# Patient Record
Sex: FEMALE | Race: WHITE | Employment: FULL TIME | URBAN - METROPOLITAN AREA
[De-identification: names, ages, dates, MRNs, and addresses within clinical notes are randomized per-mention and may not be internally consistent; named-entity substitution may affect disease eponyms.]

---

## 2020-12-03 ENCOUNTER — HOSPITAL ENCOUNTER (EMERGENCY)
Age: 35
Discharge: HOME | End: 2020-12-03
Attending: EMERGENCY MEDICINE
Payer: COMMERCIAL

## 2020-12-03 ENCOUNTER — APPOINTMENT (OUTPATIENT)
Dept: GENERAL RADIOLOGY | Age: 35
End: 2020-12-03
Payer: COMMERCIAL

## 2020-12-03 VITALS
BODY MASS INDEX: 30.6 KG/M2 | TEMPERATURE: 97.3 F | WEIGHT: 183.64 LBS | SYSTOLIC BLOOD PRESSURE: 106 MMHG | DIASTOLIC BLOOD PRESSURE: 60 MMHG | HEART RATE: 57 BPM | OXYGEN SATURATION: 96 % | HEIGHT: 65 IN | RESPIRATION RATE: 16 BRPM

## 2020-12-03 DIAGNOSIS — S82.892A CLOSED FRACTURE DISLOCATION OF LEFT ANKLE, INITIAL ENCOUNTER: Primary | ICD-10-CM

## 2020-12-03 PROCEDURE — 2500000003 HC RX 250 WO HCPCS: Performed by: EMERGENCY MEDICINE

## 2020-12-03 PROCEDURE — 99285 EMERGENCY DEPT VISIT HI MDM: CPT

## 2020-12-03 PROCEDURE — 27788 TREATMENT OF ANKLE FRACTURE: CPT

## 2020-12-03 PROCEDURE — 27824 TREAT LOWER LEG FRACTURE: CPT

## 2020-12-03 PROCEDURE — 73610 X-RAY EXAM OF ANKLE: CPT

## 2020-12-03 PROCEDURE — 6370000000 HC RX 637 (ALT 250 FOR IP): Performed by: EMERGENCY MEDICINE

## 2020-12-03 PROCEDURE — 96372 THER/PROPH/DIAG INJ SC/IM: CPT

## 2020-12-03 RX ORDER — OXYCODONE HYDROCHLORIDE AND ACETAMINOPHEN 5; 325 MG/1; MG/1
2 TABLET ORAL ONCE
Status: COMPLETED | OUTPATIENT
Start: 2020-12-03 | End: 2020-12-03

## 2020-12-03 RX ORDER — NAPROXEN 250 MG/1
500 TABLET ORAL ONCE
Status: COMPLETED | OUTPATIENT
Start: 2020-12-03 | End: 2020-12-03

## 2020-12-03 RX ORDER — ONDANSETRON 4 MG/1
4 TABLET, ORALLY DISINTEGRATING ORAL ONCE
Status: COMPLETED | OUTPATIENT
Start: 2020-12-03 | End: 2020-12-03

## 2020-12-03 RX ORDER — CITALOPRAM 20 MG/1
40 TABLET ORAL DAILY
COMMUNITY

## 2020-12-03 RX ORDER — KETAMINE HYDROCHLORIDE 50 MG/ML
4 INJECTION, SOLUTION, CONCENTRATE INTRAMUSCULAR; INTRAVENOUS ONCE
Status: COMPLETED | OUTPATIENT
Start: 2020-12-03 | End: 2020-12-03

## 2020-12-03 RX ORDER — ONDANSETRON 4 MG/1
4 TABLET, ORALLY DISINTEGRATING ORAL EVERY 8 HOURS PRN
Qty: 20 TABLET | Refills: 0 | Status: SHIPPED | OUTPATIENT
Start: 2020-12-03

## 2020-12-03 RX ORDER — OXYCODONE HYDROCHLORIDE AND ACETAMINOPHEN 5; 325 MG/1; MG/1
1 TABLET ORAL EVERY 6 HOURS PRN
Qty: 16 TABLET | Refills: 0 | Status: ON HOLD | OUTPATIENT
Start: 2020-12-03 | End: 2020-12-07 | Stop reason: HOSPADM

## 2020-12-03 RX ORDER — NAPROXEN 500 MG/1
500 TABLET ORAL 2 TIMES DAILY PRN
Qty: 60 TABLET | Refills: 0 | Status: ON HOLD | OUTPATIENT
Start: 2020-12-03 | End: 2020-12-07 | Stop reason: HOSPADM

## 2020-12-03 RX ADMIN — OXYCODONE HYDROCHLORIDE AND ACETAMINOPHEN 2 TABLET: 5; 325 TABLET ORAL at 09:34

## 2020-12-03 RX ADMIN — KETAMINE HYDROCHLORIDE 335 MG: 50 INJECTION INTRAMUSCULAR; INTRAVENOUS at 11:36

## 2020-12-03 RX ADMIN — ONDANSETRON 4 MG: 4 TABLET, ORALLY DISINTEGRATING ORAL at 10:03

## 2020-12-03 RX ADMIN — NAPROXEN 500 MG: 250 TABLET ORAL at 09:34

## 2020-12-03 RX ADMIN — ONDANSETRON 4 MG: 4 TABLET, ORALLY DISINTEGRATING ORAL at 13:41

## 2020-12-03 ASSESSMENT — PAIN DESCRIPTION - DESCRIPTORS: DESCRIPTORS: ACHING;THROBBING

## 2020-12-03 ASSESSMENT — PAIN DESCRIPTION - PAIN TYPE: TYPE: ACUTE PAIN

## 2020-12-03 NOTE — ED NOTES
Checked on patient. Patient resting comfortably at this time.       Neel Del Cid RN  12/03/20 4879
Closed reduction completed by Dr. Mario Grullon. RN did a short leg OCL and sugar tong per Dr. Mario Grullon order. Patient tolerated well.        Maria Del Carmen Barber RN  12/03/20 0917
Discharge instructions reviewed with patient and . Patient and  verbalized understanding. Prescription x3 given. Patient given crutches unable to practice. Patient stated has used crutches before. Patient stood to make sure measurement was correct. Patient with movement has nausea. Patient placed in wheelchair to be taken out to car with .        Maria Del Carmen Barber RN  12/03/20 5229
Dr. Madeleine Sims into speak with patient and . Patient stated ready for discharge.       Elisha Brewer RN  12/03/20 8332
Informed consent completed. Dr. Marylene Levin in room discussing procedure and medication that will be given.        Oral HIGINIO Hugo  12/03/20 9797
Ketamine given per Dr. Antoinette Linton order.        Neel Del Cid RN  12/03/20 9681
Patient answers to her name but unable to tell us where she is at. Nystagmus in eyes.       Gabrielle Mendoza RN  12/03/20 3394
Patient has complaints of nausea. Will ask Dr. Rebecca Lieberman for nausea medication.        Lilli Rebollar RN  12/03/20 1600
Time out done per Dr. Juan Lawrence and nurses.        Dennie Leeds, RN  12/03/20 4351
Zofran given per order.        Timothy Castro RN  12/03/20 8576
Hypertension, unspecified type

## 2020-12-03 NOTE — ED PROVIDER NOTES
[facility]  Chief Complaint   Patient presents with    Ankle Pain     Patient slid on ice and injured left ankle. Some deformity. HISTORY OF PRESENT ILLNESS  Vu Diaz is a 37 y.o. female who presents to the ED complaining of left ankle deformity sustained just prior to arrival and unable to bear weight since her injury which was related to a slip and fall on ice. She denies any other injuries. Her left ankle does appear deformed and she has tenderness diffusely at the ankle joint. Denies any pain at the knee or hip on the left lower extremity. She does have some tingling distal to the foot. No laceration. No injury to the right lower extremity, upper extremities, head or neck. She says that the backpack took the brunt of the rest of the fall. No other complaints, modifying factors or associated symptoms. Nursing notes reviewed. Past Medical History:   Diagnosis Date    Depression      History reviewed. No pertinent surgical history. History reviewed. No pertinent family history.   Social History     Socioeconomic History    Marital status:      Spouse name: Not on file    Number of children: Not on file    Years of education: Not on file    Highest education level: Not on file   Occupational History    Not on file   Social Needs    Financial resource strain: Not on file    Food insecurity     Worry: Not on file     Inability: Not on file    Transportation needs     Medical: Not on file     Non-medical: Not on file   Tobacco Use    Smoking status: Current Every Day Smoker     Packs/day: 0.50     Types: Cigarettes    Smokeless tobacco: Never Used   Substance and Sexual Activity    Alcohol use: Never     Frequency: Never    Drug use: Never    Sexual activity: Yes     Partners: Male   Lifestyle    Physical activity     Days per week: Not on file     Minutes per session: Not on file    Stress: Not on file   Relationships  Social connections     Talks on phone: Not on file     Gets together: Not on file     Attends Denominational service: Not on file     Active member of club or organization: Not on file     Attends meetings of clubs or organizations: Not on file     Relationship status: Not on file    Intimate partner violence     Fear of current or ex partner: Not on file     Emotionally abused: Not on file     Physically abused: Not on file     Forced sexual activity: Not on file   Other Topics Concern    Not on file   Social History Narrative    Not on file     Current Facility-Administered Medications   Medication Dose Route Frequency Provider Last Rate Last Dose    ketamine (KETALAR) injection 335 mg  4 mg/kg Intramuscular Once Antonia Shay MD         Current Outpatient Medications   Medication Sig Dispense Refill    citalopram (CELEXA) 20 MG tablet Take 40 mg by mouth daily      oxyCODONE-acetaminophen (PERCOCET) 5-325 MG per tablet Take 1 tablet by mouth every 6 hours as needed for Pain (CAUTION: This medication can cause dizziness.  Do not drive or operate heavy machinery when on this medication.) for up to 7 days. 16 tablet 0    naproxen (NAPROSYN) 500 MG tablet Take 1 tablet by mouth 2 times daily as needed for Pain 60 tablet 0     No Known Allergies    REVIEW OF SYSTEMS  6 systems reviewed, pertinent positives per HPI otherwise noted to be negative    PHYSICAL EXAM   /84   Pulse 80   Temp 97.3 °F (36.3 °C) (Oral)   Resp 20   Ht 5' 5\" (1.651 m)   Wt 183 lb 10.3 oz (83.3 kg)   SpO2 100%   BMI 30.56 kg/m²    GENERAL APPEARANCE: Awake and alert. Cooperative. No acute distress. HEAD: Normocephalic. Atraumatic. EYES: PERRL. EOM's grossly intact. ENT: Mucous membranes are moist.   NECK: Supple. Normal ROM. CHEST: Equal symmetric chest rise. RRR  LUNGS: Breathing is unlabored. Speaking comfortably in full sentences. CTAB  ABDOMEN: Nondistended, nontender  MUSCULOSKELETAL:  RLE: No tenderness.   2+ DP and PT. Sensation and motor function fully intact. Full range of motion of all major joints. No erythema, bruising, or lacerations. Compartments are soft. 2+ patellar reflex. Achilles nontender and intact. Able to bear weight. No joint swelling or effusions are noted. LLE: Close deformity noted to the left ankle with gross dislocation appreciated without laceration. There is tenderness diffusely at the ankle joint. Distally the foot has a 2+ DP and PT pulse, no mottling, some tingling but no dense numbness appreciated. There is no tenderness at the proximal fibula, knee, or proximal left lower extremity. Sensation and motor function fully intact. Full range of motion of OTHER major joints except ankle. Bruising to left ankle. No OTHER erythema, bruising, or lacerations. Compartments are soft. 2+ patellar reflex. Achilles nontender and intact. UNABLE to bear weight. No joint swelling or effusions are noted. SKIN: Warm and dry. No acute rashes. NEUROLOGICAL: Alert and oriented. Strength is 5/5 in all extremities and sensation is intact. RADIOLOGY    Xr Ankle Left (min 3 Views)    Result Date: 12/3/2020  EXAMINATION: THREE XRAY VIEWS OF THE LEFT ANKLE 12/3/2020 8:54 am COMPARISON: Examination from 1 hour prior HISTORY: ORDERING SYSTEM PROVIDED HISTORY: reduction/splint TECHNOLOGIST PROVIDED HISTORY: Reason for exam:->reduction/splint Reason for Exam: post reduction in splint Acuity: Acute Type of Exam: Subsequent/Follow-up FINDINGS: Improved alignment of the ankle mortise and distal fibular fracture status post reduction. Overlying splinting material obscures fine osseous detail. Improved alignment status post reduction. Xr Ankle Left (min 3 Views)    Result Date: 12/3/2020  EXAMINATION: THREE XRAY VIEWS OF THE LEFT ANKLE 12/3/2020 9:37 am COMPARISON: None.  HISTORY: ORDERING SYSTEM PROVIDED HISTORY: trauma TECHNOLOGIST PROVIDED HISTORY: Reason for exam:->trauma Reason for Exam: pt has pain in her entire ankle with swelling s/p falling on ice today Acuity: Acute Type of Exam: Initial Mechanism of Injury: fall on ice Relevant Medical/Surgical History: pt unable to flex foot FINDINGS: There is displaced oblique fracture of the distal fibular shaft at the base of the lateral malleolus. No definitive fracture elsewhere. There is anterior dislocation of the fibula and tibia in relation to the talus and lateral malleolus. Fracture-dislocation of the left ankle as discussed. ED COURSE/MDM  Differential diagnosis considerations included: abrasion/laceration, contusion, fracture, sprain/strain, dislocation    The patient's ED course was notable for left ankle fracture dislocation. No other injuries noted. No evidence of arterial injury or neurovascular compromise. Patient was sedated with IM ketamine per procedure note below, at this freestanding ED we cannot use propofol or etomidate per hospital policy. Post reduction films after sedation and placement of sugar tong plus short leg splint showed improved alignment. Nonweightbearing, crutches, pain medication for home and follow-up with orthopedics as an outpatient and may need surgery. Of note the patient adamantly denies chance of pregnancy and also has an IUD which I confirmed prior to administration of sedation. Vicky Magallon NP from ortho with Dr. Sariah Melgoza was consulted about the patient's ED history, physical, workup, and course so far. Recommendations from this consultant included agreement with plan as above. Fracture/Dislocation Management Procedure Note    Indication: joint fracture-dislocation    Location: L ankle    Consent: The patient was counseled regarding the procedure, it's indications, risks, potential complications and alternatives and any questions were answered. Consent was obtained. Procedure:    The pre-procedural examination showed distal perfusion & neurologic function to be normal. The patient was placed in the appropriate position. Anesthesia/pain control was obtained using conscious sedation -SEE CONSCIOUS SEDATION NOTE FOR DETAILS (below). Reduction of the left ankle was performed by direct traction-countertraction    Post reduction films were obtained and revealed satisfactory reduction. A post-reduction exam revealed distal perfusion & neurologic function to be normal.    Immobilization: I directly participated in the placement with the RN of a sugar tong + posterior short leg splint on the LLE. I have examined the splint thoroughly for functionality. Extremity is distally neurovascularly intact with movement of digits, normal sensation and brisk cap refill. We discussed splint precautions including development of worsening swelling, pain, numbness, tingling, or weakness. The patient tolerated the procedure well. This procedure is not definitive care for this injury. Will need orthopedics consultation as outpatient and maybe need surgery. Complications: None        Conscious Sedation Procedure Note    Indication: fracture dislocation    Consent: I have discussed with the patient and/or the patient representative the indication, alternatives, and the possible risks and/or complications of the planned procedure and the anesthesia methods. The patient and/or patient representative appear to understand and agree to proceed. Physician Involvement: The attending physician was present and supervising this procedure. Pre-Sedation Documentation and Exam: I have personally completed a history, physical exam & review of systems for this patient (see notes). Vital signs have been reviewed (see flow sheet for vitals). I have reviewed the patient's history and review of systems. Lungs: clear to auscultation bilaterally, Cardiovascular: regular rate and rhythm.   Airway Assessment: normal, Mallampati Class I - (soft palate, fauces, uvula & anterior/posterior tonsillar pillars are visible)    Prior History of Anesthesia Complications: none    ASA Classification: Class 1 - A normal healthy patient    Sedation/ Anesthesia Plan: ketamine IM    Medications Used: ketamine intramuscularly    Monitoring and Safety: The patient was placed on a cardiac monitor and vital signs, pulse oximetry and level of consciousness were continuously evaluated throughout the procedure. The patient was closely monitored until recovery from the medications was complete and the patient had returned to baseline status. Respiratory therapy was on standby at all times during the procedure. (The following sections must be completed)  Post-Sedation Vital Signs: Vital signs were reviewed and were stable after the procedure (see flow sheet for vitals)            Post-Sedation Exam: Lungs: clear to auscultation bilaterally and Cardiovascular: regular rate and rhythm           Complications: none        Patient was given scripts for the following medications. I counseled patient how to take these medications. New Prescriptions    NAPROXEN (NAPROSYN) 500 MG TABLET    Take 1 tablet by mouth 2 times daily as needed for Pain    OXYCODONE-ACETAMINOPHEN (PERCOCET) 5-325 MG PER TABLET    Take 1 tablet by mouth every 6 hours as needed for Pain (CAUTION: This medication can cause dizziness.  Do not drive or operate heavy machinery when on this medication.) for up to 7 days. CLINICAL IMPRESSION  1. Closed fracture dislocation of left ankle, initial encounter        Blood pressure 123/84, pulse 80, temperature 97.3 °F (36.3 °C), temperature source Oral, resp. rate 20, height 5' 5\" (1.651 m), weight 183 lb 10.3 oz (83.3 kg), SpO2 100 %. DISPOSITION    I have discussed the findings of today's workup with the patient and addressed the patient's questions and concerns. Important warning signs as well as new or worsening symptoms which would necessitate immediate return to the ED were discussed.   The plan is to discharge from the ED at this time, and the patient is in stable condition. The patient acknowledged understanding is agreeable with this plan. Follow-up with: Ynes Banegas MD  [facility]  [address] [place] [address]  [phone]    Schedule an appointment as soon as possible for a visit in 2 days  For symptom re-evaluation    [facility]  [id number]  [phone]  Go to   If symptoms worsen      This chart was created using Dragon dictation software. Efforts were made by me to ensure accuracy, however some errors may be present due to limitations of this technology.         Margot Bonilla MD  12/03/20 9314

## 2020-12-04 ENCOUNTER — ANESTHESIA EVENT (OUTPATIENT)
Dept: OPERATING ROOM | Age: 35
End: 2020-12-04
Payer: COMMERCIAL

## 2020-12-04 ENCOUNTER — TELEPHONE (OUTPATIENT)
Dept: ORTHOPEDIC SURGERY | Age: 35
End: 2020-12-04

## 2020-12-04 ENCOUNTER — OFFICE VISIT (OUTPATIENT)
Dept: ORTHOPEDIC SURGERY | Age: 35
End: 2020-12-04
Payer: COMMERCIAL

## 2020-12-04 VITALS — WEIGHT: 183 LBS | TEMPERATURE: 97.2 F | HEIGHT: 65 IN | BODY MASS INDEX: 30.49 KG/M2

## 2020-12-04 PROCEDURE — G8427 DOCREV CUR MEDS BY ELIG CLIN: HCPCS | Performed by: ORTHOPAEDIC SURGERY

## 2020-12-04 PROCEDURE — 1036F TOBACCO NON-USER: CPT | Performed by: ORTHOPAEDIC SURGERY

## 2020-12-04 PROCEDURE — G8484 FLU IMMUNIZE NO ADMIN: HCPCS | Performed by: ORTHOPAEDIC SURGERY

## 2020-12-04 PROCEDURE — 99203 OFFICE O/P NEW LOW 30 MIN: CPT | Performed by: ORTHOPAEDIC SURGERY

## 2020-12-04 PROCEDURE — G8417 CALC BMI ABV UP PARAM F/U: HCPCS | Performed by: ORTHOPAEDIC SURGERY

## 2020-12-04 RX ORDER — OXYCODONE HYDROCHLORIDE AND ACETAMINOPHEN 5; 325 MG/1; MG/1
1 TABLET ORAL EVERY 6 HOURS PRN
Qty: 12 TABLET | Refills: 0 | Status: ON HOLD | OUTPATIENT
Start: 2020-12-04 | End: 2020-12-07 | Stop reason: HOSPADM

## 2020-12-04 NOTE — LETTER
Dr Elham Hatfield [phone] F: [phone]  Surgery Scheduling Form:  DEMOGRAPHICS:                                                                                                              .    Patient Name:  Eugene Antunez    Patient :  1985   Patient SS#:  [id number]      Patient Phone:  [phone] (home)      Patient Address:  [address] [id number]    Insurance:    Payor/Plan Subscr  Sex Relation Sub. Ins. ID Effective Group Num   1. Danial Novak [id number] Male  [id number]                                   [address]     DIAGNOSIS & PROCEDURE:                                                                                            .    Diagnosis:  Left ankle fracture/dislocation S82.892A    Operation:  left ankle open reduction internal fixation 90275    Location:  [facility]    Surgeon:  Radha Joseph    SCHEDULING INFORMATION:                                                                                         .    Requested Date:  20 OR Time: 9:55 am  Patient Arrival Time: 7:55 am     OR Time Required:  75  Minutes    Anesthesia:  General    SA Required:  Yes    Equipment:  Synthes    Mini C-Arm:  No   Standard C-Arm:  Yes    Status:  outpatient PAT Required:  Yes  COVID19 test:  rapid    Latex Allergy:  no Defibrilator or Pacemaker:  no    Isolation Precautions:  no                       Prince Eid MD     20   BILLING INFORMATION:                                                                                                    .                              CPT Code Modifier  ORIF    Prior auth:pending                                                  Pre-Admission Testing Order Set   In accordance with our formulary system, a generic equivalent drug may be dispensed and administered unless a D.A. W. is written with the medication order All orders without checkboxes will processed automatically unless crossed out. Orders with checkboxes MUST be checked in order to be carried out. Knight Bolus                                                        1985  Date of Procedure/Surgery: 12/7/20  1. H & P for ALL procedure/surgery completed within 30 days, to be updated day of procedure/surgery  2. [ ]Consults: PT/OT evaluation  3. [X ]Defer to Anesthesia for Pre-Admission testing orders  4. Diagnostic Tests:  [ ]EKG (if not completed in last 3 months) IF: (check all that apply)   Other:  [ Carballo Dowse (if not completed in last 6 months) IF: (check all that apply)   Hx Malignancy   Hx CVS/Thoracic Surg   CVS Disease   Hx Pneumonia last 3 months   Chronic Pulm Disease  Other:  [ ]Radiology   Knee Right Left Standing AP knee, hip to ankle on scoliosis film   Other:  5. Labs  [ ]Basic Metabolic Profile  IF: (check all that apply)  [ ]Albumin    Other:     __________________________________________________________________  [ ]CBC without diff   Pre op exam      __________________________________________________________________  [ ]PT/INR  PTT   Pre op exam         __________________________________________________________________  [ ]Type & Screen   Surg with potiential for signif.  blood loss  [ ]Type & cross match 2 units         __________________________________________________________________  [ ]Urine routine reflex to culture (UAR) If cultures positive, repeat on                  admission [ Sujey Mare catch urine  [ ]Nasal culture for MRSA   [ ]Nasal MRSA culture  __________________________________________________________________  6. [ ]Other:      TO: ___________________________________________ Date: ____________ Time: _________    Physician Signature:            12/4/2020 9:48 AM                   Pre-operative Physician Prophylaxis Orders      Knight Bolus  1985 All orders without checkboxes will be processed automatically unless crossed out. Orders with checkboxes MUST be checked in order to be carried out. 1. Allergies: Patient has no known allergies. 2. Procedure:  left ankle open reduction internal fixation             Ht Readings from Last 1 Encounters:   12/04/20 5' 5\" (1.651 m)               Wt Readings from Last 1 Encounters:   12/04/20 183 lb (83 kg)     4. [ ] DVT Prophylaxis-Contraindication (Do not give)  Allergy to heparin Currently on anticoagulation  Not indicated, low risk patient  Thrombocytopenia Admitted for bleeding diagnosis Surgery or invasive procedure  [ ] Sequential Compression Boots to unaffected or bilateral extremities  [ ] Venous Compression Hose (LAURA hose) to unaffected or bilateral extremities        Knee high  [ ] Heparin 5,000 units subcutaneously 1-2 hours pre op into the thigh opposite of operative site    5.   [ ] Beta Blocker  Patient to take beta blocker the morning of surgery with a sip of water as prescribed at home  Other:    6. Tonny.Appl ] Antimicrobial Prophylaxis (Consensus Guideline Recommendations) for       S.C.I. P. procedures  All antibiotics to be initiated 30 minutes pre-op (prior to leaving pre-op hold area/ACU) EXCEPT: Vancomycin and Ciprofloxacin. Initiate Vancomycin and Ciprofloxacin 2 hours pre-op. For combination antibiotic orders, start Ciprofloxacin first, then start second antibiotic ordered. In house patients, send pre-op antibiotics with patient to pre-op hold, do not initiate.     Surgical Procedure Routine pre-op Antibiotic  Penicillin or Cephalosporin Allergy  Orthopaedic  X Cefazolin (Ancef) 2 gm IVPB x1 X Clindamycin 900 mg IVPB x1    or     If > 80 kg Cefazolin 2 gm IVPB x1 Vancomycin 1 gm IVPB x1  Approved indications for Vancomycin:  MRSA related chronic wound dialysis  Other antibiotic to be given:    TO: _______________________________________________________ Date: ____________ Time: _______ Physician Signature:             Date: 12/4/2020  Time: 9:48 AM    Faxed to Pharmacy RN Signature: _________________________________ Date: _________ Time: _______

## 2020-12-04 NOTE — PROGRESS NOTES
[facility] time_0800___________        Surgery time__1000__________    Take the following medications with a sip of water: Follow your MD/Surgeons pre-procedure instructions regarding your medications    Do not eat or drink anything after 12:00 midnight prior to your surgery. This includes water chewing gum, mints and ice chips. You may brush your teeth and gargle the morning of your surgery, but do not swallow the water     Please see your family doctor/pediatrician for a history and physical and/or concerning medications. Bring any test results/reports from your physicians office. If you are under the care of a heart doctor or specialist doctor, please be aware that you may be asked to them for clearance    You may be asked to stop blood thinners such as Coumadin, Plavix, Fragmin, Lovenox, etc., or any anti-inflammatories such as:  Aspirin, Ibuprofen, Advil, Naproxen prior to your surgery. We also ask that you stop any OTC medications such as fish oil, vitamin E, glucosamine, garlic, Multivitamins, COQ 10, etc.    We ask that you do not smoke 24 hours prior to surgery  We ask that you do not  drink any alcoholic beverages 24 hours prior to surgery     You must make arrangements for a responsible adult to take you home after your surgery. For your safety you will not be allowed to leave alone or drive yourself home. Your surgery will be cancelled if you do not have a ride home. Also for your safety, it is strongly suggested that someone stay with you the first 24 hours after your surgery. A parent or legal guardian must accompany a child scheduled for surgery and plan to stay at the hospital until the child is discharged. Please do not bring other children with you. For your comfort, please wear simple loose fitting clothing to the hospital.  Please do not bring valuables.     Do not wear any make-up or nail polish on your fingers or toes    Remember. Kasey Carreno Kaseyanirudh Carreno Safety First! Call before you Fall      For your safety, please do not wear any jewelry or body piercing's on the day of surgery. All jewelry must be removed. If you have dentures, they will be removed before going to operating room. For your convenience, we will provide you with a container. If you wear contact lenses or glasses, they will be removed, please bring a case for them. If you have a living will and a durable power of  for healthcare, please bring in a copy. As part of our patient safety program to minimize surgical site infections, we ask you to do the following:    · Please notify your surgeon if you develop any illness between         now and the  day of your surgery. · This includes a cough, cold, fever, sore throat, nausea,         or vomiting, and diarrhea, etc.  ·  Please notify your surgeon if you experience dizziness, shortness         of breath or blurred vision between now and the time of your surgery. Do not shave your operative site 96 hours prior to surgery. For face and neck surgery, men may use an electric razor 48 hours   prior to surgery. You may shower the night before surgery or the morning of   your surgery with an antibacterial soap. You will need to bring a photo ID and insurance card    [facility] has an onsite pharmacy, would you like to utilize our pharmacy     If you will be staying overnight and use a C-pap machine, please bring   your C-pap to hospital     Our goal is to provide you with excellent care, therefore, visitors will be limited to two(2) in the room at a time so that we may focus on providing this care for you. Please contact pre-admission testing if you have any further questions.                  [facility] phone number:  [phone] PAT fax number:  [phone]  Please note these are generalized instructions for all surgical cases, you may be provided with more specific instructions according to your surgery.

## 2020-12-04 NOTE — PROGRESS NOTES
ORTHOPAEDIC NEW PATIENT NOTE    Chief Complaint   Patient presents with    Ankle Injury     Left       HPI  37 y.o. female seen for evaluation of left ankle fracture/dislocation:    Onset yesterday  Injury/trauma - slipped on ice  History of symptoms denies  Pain is located left ankle diffuse  Worse with dependent position, motion, WB  Better with splint  Associated with obvious deformity, reduced in ED    Does not smoke  No history of VTE    I have reviewed and discussed the below pain assessment findings with the patient. Pain Assessment  Location of Pain: Ankle  Location Modifiers: Left  Severity of Pain: 5  Quality of Pain: Throbbing  Duration of Pain: Persistent  Frequency of Pain: Constant  Date Pain First Started: 12/03/20  Aggravating Factors: Stairs, Walking, Standing  Limiting Behavior: Yes  Relieving Factors: Nsaids, Rest  Result of Injury: Yes  Work-Related Injury: No  Are there other pain locations you wish to document?: No    Review of Systems  I have read over the ROS from the Patient History Form dated on 12/4/2020  Pertinent positives include - N/A, none listed  Rest of 13 point ROS otherwise negative except per HPI, and scanned into the patient's chart under the Media tab. No Known Allergies     Current Outpatient Medications   Medication Sig Dispense Refill    oxyCODONE-acetaminophen (PERCOCET) 5-325 MG per tablet Take 1 tablet by mouth every 6 hours as needed for Pain for up to 3 days. 12 tablet 0    citalopram (CELEXA) 20 MG tablet Take 40 mg by mouth daily      oxyCODONE-acetaminophen (PERCOCET) 5-325 MG per tablet Take 1 tablet by mouth every 6 hours as needed for Pain (CAUTION: This medication can cause dizziness.  Do not drive or operate heavy machinery when on this medication.) for up to 7 days.  16 tablet 0    naproxen (NAPROSYN) 500 MG tablet Take 1 tablet by mouth 2 times daily as needed for Pain 60 tablet 0    ondansetron (ZOFRAN ODT) 4 MG disintegrating tablet Take 1 tablet by mouth every 8 hours as needed for Nausea or Vomiting 20 tablet 0     No current facility-administered medications for this visit.         Past Medical History:   Diagnosis Date    Depression     IUD (intrauterine device) in place     Prolonged emergence from general anesthesia     Slow to wake up        Past Surgical History:   Procedure Laterality Date    APPENDECTOMY       SECTION      X 2       Family History   Problem Relation Age of Onset    High Blood Pressure Father          Social History     Socioeconomic History    Marital status:      Spouse name: Not on file    Number of children: Not on file    Years of education: Not on file    Highest education level: Not on file   Occupational History    Not on file   Social Needs    Financial resource strain: Not on file    Food insecurity     Worry: Not on file     Inability: Not on file    Transportation needs     Medical: Not on file     Non-medical: Not on file   Tobacco Use    Smoking status: Former Smoker     Packs/day: 0.50     Types: Cigarettes    Smokeless tobacco: Never Used   Substance and Sexual Activity    Alcohol use: Never     Frequency: Never    Drug use: Never    Sexual activity: Yes     Partners: Male   Lifestyle    Physical activity     Days per week: Not on file     Minutes per session: Not on file    Stress: Not on file   Relationships    Social connections     Talks on phone: Not on file     Gets together: Not on file     Attends Religion service: Not on file     Active member of club or organization: Not on file     Attends meetings of clubs or organizations: Not on file     Relationship status: Not on file    Intimate partner violence     Fear of current or ex partner: Not on file     Emotionally abused: Not on file     Physically abused: Not on file     Forced sexual activity: Not on file   Other Topics Concern    Not on file   Social History Narrative    Not on file        Vitals:    20 0901 Temp: 97.2 °F (36.2 °C)   Weight: 183 lb (83 kg)   Height: 5' 5\" (1.651 m)       Physical Exam  Constitutional - well-groomed, well-nourished, Body mass index is 30.45 kg/m².   Psychiatric - pleasant, normal mood & affect, oriented to place, person, and situation  Cardiovascular - RRR, positive peripheral edema, no varicosities, dorsalis pedis pulse 2+  Respiratory - respirations unlabored, on room air; face mask on  Skin - no rashes, wounds, or lesions seen on exposed skin  Neurological - SILT SP/DP/T/sural/saphenous nerve distributions; EHL/FHL/TA/GS intact  Left foot/ankle - splint partially taken down and skin examined   No open wounds   Moderate swelling   Diffusely TTP around the ankle   No TTP forefoot/midfoot      Imaging:  Images were personally reviewed by myself and discussed with the patient  Narrative    EXAMINATION:    THREE XRAY VIEWS OF THE LEFT ANKLE         12/3/2020 9:37 am         COMPARISON:    None.         HISTORY:    ORDERING SYSTEM PROVIDED HISTORY: trauma    TECHNOLOGIST PROVIDED HISTORY:    Reason for exam:->trauma    Reason for Exam: pt has pain in her entire ankle with swelling s/p falling on    ice today    Acuity: Acute    Type of Exam: Initial    Mechanism of Injury: fall on ice    Relevant Medical/Surgical History: pt unable to flex foot         FINDINGS:    There is displaced oblique fracture of the distal fibular shaft at the base    of the lateral malleolus.  No definitive fracture elsewhere. Jorge Luis Honour is    anterior dislocation of the fibula and tibia in relation to the talus and    lateral malleolus.              Impression    Fracture-dislocation of the left ankle as discussed.               Narrative    EXAMINATION:    THREE XRAY VIEWS OF THE LEFT ANKLE         12/3/2020 8:54 am         COMPARISON:    Examination from 1 hour prior         HISTORY:    ORDERING SYSTEM PROVIDED HISTORY: reduction/splint    TECHNOLOGIST PROVIDED HISTORY:    Reason for exam:->reduction/splint Reason for Exam: post reduction in splint    Acuity: Acute    Type of Exam: Subsequent/Follow-up         FINDINGS:    Improved alignment of the ankle mortise and distal fibular fracture status    post reduction.  Overlying splinting material obscures fine osseous detail.              Impression    Improved alignment status post reduction.             My interpretation:  There may be a small posterior malleolar fx component. At minimum, bimalleolar equivalent with medial clear space widening. Assessment & Plan:  37 y.o. female who presents with    Diagnosis Orders   1. Closed fracture of left ankle, initial encounter  oxyCODONE-acetaminophen (PERCOCET) 5-325 MG per tablet       No orders of the defined types were placed in this encounter. Reviewed injury and radiographs with the patient and her   Discussed treatment options  I have recommended ORIF due to fracture displacement, dislocation present    Risks and benefits of left ankle fracture/dislocation operative fixation were discussed at length with the patient and her  and an opportunity for questions was afforded. Possible complications of this surgery/fracture include, but are not limited to, non-union, malunion, persistent pain, post-traumatic arthritis, stiffness, infection, weakness, blood clots, bleeding, neurovascular injury, numbness around the surgical incision, hardware failure, periprosthetic fracture, painful hardware, poor wound healing, and hypertrophic scarring/keloids. They demonstrated a good understanding of the procedure, anticipated outcomes, possible complications, the post-operative restrictions and possible therapy required, and at this time voiced desire to proceed. An informed consent form was signed in the office and the patient was given pre-operative instructions. I will see the patient back in clinic for the first post-operative visit.      Surgery on Monday morning (12/7/2020) at [facility]   In the meantime, strict elevation above heart level, ice therapy to aid in pain and swelling  Minimize narcotic usage  Will check COVID test morning of surgery    Niles Beavers

## 2020-12-07 ENCOUNTER — APPOINTMENT (OUTPATIENT)
Dept: GENERAL RADIOLOGY | Age: 35
End: 2020-12-07
Attending: ORTHOPAEDIC SURGERY
Payer: COMMERCIAL

## 2020-12-07 ENCOUNTER — HOSPITAL ENCOUNTER (OUTPATIENT)
Age: 35
Setting detail: OUTPATIENT SURGERY
Discharge: HOME | End: 2020-12-07
Attending: ORTHOPAEDIC SURGERY | Admitting: ORTHOPAEDIC SURGERY
Payer: COMMERCIAL

## 2020-12-07 ENCOUNTER — ANESTHESIA (OUTPATIENT)
Dept: OPERATING ROOM | Age: 35
End: 2020-12-07
Payer: COMMERCIAL

## 2020-12-07 VITALS
TEMPERATURE: 97.1 F | HEART RATE: 85 BPM | OXYGEN SATURATION: 96 % | RESPIRATION RATE: 17 BRPM | HEIGHT: 65 IN | DIASTOLIC BLOOD PRESSURE: 63 MMHG | WEIGHT: 183 LBS | SYSTOLIC BLOOD PRESSURE: 112 MMHG | BODY MASS INDEX: 30.49 KG/M2

## 2020-12-07 VITALS
RESPIRATION RATE: 1 BRPM | TEMPERATURE: 98.6 F | SYSTOLIC BLOOD PRESSURE: 84 MMHG | DIASTOLIC BLOOD PRESSURE: 53 MMHG | OXYGEN SATURATION: 99 %

## 2020-12-07 PROBLEM — S82.842A ANKLE FRACTURE, BIMALLEOLAR, CLOSED, LEFT, INITIAL ENCOUNTER: Status: ACTIVE | Noted: 2020-12-07

## 2020-12-07 LAB
PREGNANCY, URINE: NEGATIVE
SARS-COV-2, NAAT: NOT DETECTED

## 2020-12-07 PROCEDURE — 2580000003 HC RX 258: Performed by: ANESTHESIOLOGY

## 2020-12-07 PROCEDURE — 7100000010 HC PHASE II RECOVERY - FIRST 15 MIN: Performed by: ORTHOPAEDIC SURGERY

## 2020-12-07 PROCEDURE — 3600000014 HC SURGERY LEVEL 4 ADDTL 15MIN: Performed by: ORTHOPAEDIC SURGERY

## 2020-12-07 PROCEDURE — 7100000001 HC PACU RECOVERY - ADDTL 15 MIN: Performed by: ORTHOPAEDIC SURGERY

## 2020-12-07 PROCEDURE — 2580000003 HC RX 258: Performed by: ORTHOPAEDIC SURGERY

## 2020-12-07 PROCEDURE — 6360000002 HC RX W HCPCS: Performed by: NURSE ANESTHETIST, CERTIFIED REGISTERED

## 2020-12-07 PROCEDURE — 73610 X-RAY EXAM OF ANKLE: CPT

## 2020-12-07 PROCEDURE — 27814 TREATMENT OF ANKLE FRACTURE: CPT | Performed by: ORTHOPAEDIC SURGERY

## 2020-12-07 PROCEDURE — 6360000002 HC RX W HCPCS: Performed by: ORTHOPAEDIC SURGERY

## 2020-12-07 PROCEDURE — 3209999900 FLUORO FOR SURGICAL PROCEDURES

## 2020-12-07 PROCEDURE — 3600000004 HC SURGERY LEVEL 4 BASE: Performed by: ORTHOPAEDIC SURGERY

## 2020-12-07 PROCEDURE — 84703 CHORIONIC GONADOTROPIN ASSAY: CPT

## 2020-12-07 PROCEDURE — 3700000000 HC ANESTHESIA ATTENDED CARE: Performed by: ORTHOPAEDIC SURGERY

## 2020-12-07 PROCEDURE — 6360000002 HC RX W HCPCS: Performed by: ANESTHESIOLOGY

## 2020-12-07 PROCEDURE — 2709999900 HC NON-CHARGEABLE SUPPLY: Performed by: ORTHOPAEDIC SURGERY

## 2020-12-07 PROCEDURE — C1713 ANCHOR/SCREW BN/BN,TIS/BN: HCPCS | Performed by: ORTHOPAEDIC SURGERY

## 2020-12-07 PROCEDURE — 3700000001 HC ADD 15 MINUTES (ANESTHESIA): Performed by: ORTHOPAEDIC SURGERY

## 2020-12-07 PROCEDURE — 2500000003 HC RX 250 WO HCPCS: Performed by: NURSE ANESTHETIST, CERTIFIED REGISTERED

## 2020-12-07 PROCEDURE — 2500000003 HC RX 250 WO HCPCS: Performed by: ORTHOPAEDIC SURGERY

## 2020-12-07 PROCEDURE — U0002 COVID-19 LAB TEST NON-CDC: HCPCS

## 2020-12-07 PROCEDURE — 6370000000 HC RX 637 (ALT 250 FOR IP): Performed by: ANESTHESIOLOGY

## 2020-12-07 PROCEDURE — 7100000011 HC PHASE II RECOVERY - ADDTL 15 MIN: Performed by: ORTHOPAEDIC SURGERY

## 2020-12-07 PROCEDURE — 7100000000 HC PACU RECOVERY - FIRST 15 MIN: Performed by: ORTHOPAEDIC SURGERY

## 2020-12-07 DEVICE — PLATE BNE L93MM 8 H S STL 1/3 TBLR LOK COMPR W/ CLLR FOR: Type: IMPLANTABLE DEVICE | Site: ANKLE | Status: FUNCTIONAL

## 2020-12-07 DEVICE — SCREW BNE L18MM DIA4MM CANC S STL ST CANN NONLOCKING FULL: Type: IMPLANTABLE DEVICE | Site: ANKLE | Status: FUNCTIONAL

## 2020-12-07 DEVICE — SCREW BNE L20MM DIA3.5MM CORT S STL ST NONCANNULATED LOK: Type: IMPLANTABLE DEVICE | Site: ANKLE | Status: FUNCTIONAL

## 2020-12-07 DEVICE — SCREW BNE L14MM DIA3.5MM CORT S STL ST NONCANNULATED LOK: Type: IMPLANTABLE DEVICE | Site: ANKLE | Status: FUNCTIONAL

## 2020-12-07 DEVICE — SCREW BNE L12MM DIA3.5MM CORT S STL ST NONCANNULATED LOK: Type: IMPLANTABLE DEVICE | Site: ANKLE | Status: FUNCTIONAL

## 2020-12-07 RX ORDER — DEXAMETHASONE SODIUM PHOSPHATE 4 MG/ML
INJECTION, SOLUTION INTRA-ARTICULAR; INTRALESIONAL; INTRAMUSCULAR; INTRAVENOUS; SOFT TISSUE PRN
Status: DISCONTINUED | OUTPATIENT
Start: 2020-12-07 | End: 2020-12-07 | Stop reason: SDUPTHER

## 2020-12-07 RX ORDER — LIDOCAINE HYDROCHLORIDE 20 MG/ML
INJECTION, SOLUTION EPIDURAL; INFILTRATION; INTRACAUDAL; PERINEURAL PRN
Status: DISCONTINUED | OUTPATIENT
Start: 2020-12-07 | End: 2020-12-07 | Stop reason: SDUPTHER

## 2020-12-07 RX ORDER — MIDAZOLAM HYDROCHLORIDE 1 MG/ML
INJECTION INTRAMUSCULAR; INTRAVENOUS PRN
Status: DISCONTINUED | OUTPATIENT
Start: 2020-12-07 | End: 2020-12-07 | Stop reason: SDUPTHER

## 2020-12-07 RX ORDER — FENTANYL CITRATE 50 UG/ML
25 INJECTION, SOLUTION INTRAMUSCULAR; INTRAVENOUS EVERY 5 MIN PRN
Status: DISCONTINUED | OUTPATIENT
Start: 2020-12-07 | End: 2020-12-07 | Stop reason: HOSPADM

## 2020-12-07 RX ORDER — SODIUM CHLORIDE 9 MG/ML
INJECTION, SOLUTION INTRAVENOUS CONTINUOUS
Status: DISCONTINUED | OUTPATIENT
Start: 2020-12-07 | End: 2020-12-07 | Stop reason: HOSPADM

## 2020-12-07 RX ORDER — OXYCODONE HYDROCHLORIDE AND ACETAMINOPHEN 5; 325 MG/1; MG/1
1 TABLET ORAL EVERY 8 HOURS PRN
Qty: 20 TABLET | Refills: 0 | Status: SHIPPED | OUTPATIENT
Start: 2020-12-07 | End: 2020-12-14

## 2020-12-07 RX ORDER — PROPOFOL 10 MG/ML
INJECTION, EMULSION INTRAVENOUS PRN
Status: DISCONTINUED | OUTPATIENT
Start: 2020-12-07 | End: 2020-12-07 | Stop reason: SDUPTHER

## 2020-12-07 RX ORDER — KETOROLAC TROMETHAMINE 30 MG/ML
INJECTION, SOLUTION INTRAMUSCULAR; INTRAVENOUS PRN
Status: DISCONTINUED | OUTPATIENT
Start: 2020-12-07 | End: 2020-12-07 | Stop reason: SDUPTHER

## 2020-12-07 RX ORDER — MAGNESIUM HYDROXIDE 1200 MG/15ML
LIQUID ORAL CONTINUOUS PRN
Status: COMPLETED | OUTPATIENT
Start: 2020-12-07 | End: 2020-12-07

## 2020-12-07 RX ORDER — FENTANYL CITRATE 50 UG/ML
INJECTION, SOLUTION INTRAMUSCULAR; INTRAVENOUS PRN
Status: DISCONTINUED | OUTPATIENT
Start: 2020-12-07 | End: 2020-12-07 | Stop reason: SDUPTHER

## 2020-12-07 RX ORDER — ONDANSETRON 2 MG/ML
INJECTION INTRAMUSCULAR; INTRAVENOUS PRN
Status: DISCONTINUED | OUTPATIENT
Start: 2020-12-07 | End: 2020-12-07 | Stop reason: SDUPTHER

## 2020-12-07 RX ORDER — BUPIVACAINE HYDROCHLORIDE 5 MG/ML
INJECTION, SOLUTION EPIDURAL; INTRACAUDAL
Status: COMPLETED | OUTPATIENT
Start: 2020-12-07 | End: 2020-12-07

## 2020-12-07 RX ORDER — OXYCODONE HYDROCHLORIDE AND ACETAMINOPHEN 5; 325 MG/1; MG/1
2 TABLET ORAL PRN
Status: COMPLETED | OUTPATIENT
Start: 2020-12-07 | End: 2020-12-07

## 2020-12-07 RX ORDER — SODIUM CHLORIDE 0.9 % (FLUSH) 0.9 %
10 SYRINGE (ML) INJECTION PRN
Status: DISCONTINUED | OUTPATIENT
Start: 2020-12-07 | End: 2020-12-07 | Stop reason: HOSPADM

## 2020-12-07 RX ORDER — APREPITANT 40 MG/1
40 CAPSULE ORAL ONCE
Status: COMPLETED | OUTPATIENT
Start: 2020-12-07 | End: 2020-12-07

## 2020-12-07 RX ORDER — FENTANYL CITRATE 50 UG/ML
50 INJECTION, SOLUTION INTRAMUSCULAR; INTRAVENOUS ONCE
Status: COMPLETED | OUTPATIENT
Start: 2020-12-07 | End: 2020-12-07

## 2020-12-07 RX ORDER — OXYCODONE HYDROCHLORIDE AND ACETAMINOPHEN 5; 325 MG/1; MG/1
1 TABLET ORAL PRN
Status: COMPLETED | OUTPATIENT
Start: 2020-12-07 | End: 2020-12-07

## 2020-12-07 RX ORDER — SODIUM CHLORIDE 0.9 % (FLUSH) 0.9 %
10 SYRINGE (ML) INJECTION EVERY 12 HOURS SCHEDULED
Status: DISCONTINUED | OUTPATIENT
Start: 2020-12-07 | End: 2020-12-07 | Stop reason: HOSPADM

## 2020-12-07 RX ORDER — ONDANSETRON 2 MG/ML
4 INJECTION INTRAMUSCULAR; INTRAVENOUS
Status: DISCONTINUED | OUTPATIENT
Start: 2020-12-07 | End: 2020-12-07 | Stop reason: HOSPADM

## 2020-12-07 RX ADMIN — ONDANSETRON 4 MG: 2 INJECTION INTRAMUSCULAR; INTRAVENOUS at 12:22

## 2020-12-07 RX ADMIN — APREPITANT 40 MG: 40 CAPSULE ORAL at 08:51

## 2020-12-07 RX ADMIN — HYDROMORPHONE HYDROCHLORIDE 0.25 MG: 1 INJECTION, SOLUTION INTRAMUSCULAR; INTRAVENOUS; SUBCUTANEOUS at 12:17

## 2020-12-07 RX ADMIN — DEXAMETHASONE SODIUM PHOSPHATE 8 MG: 4 INJECTION, SOLUTION INTRAMUSCULAR; INTRAVENOUS at 11:33

## 2020-12-07 RX ADMIN — SODIUM CHLORIDE: 9 INJECTION, SOLUTION INTRAVENOUS at 08:49

## 2020-12-07 RX ADMIN — HYDROMORPHONE HYDROCHLORIDE 0.25 MG: 1 INJECTION, SOLUTION INTRAMUSCULAR; INTRAVENOUS; SUBCUTANEOUS at 11:49

## 2020-12-07 RX ADMIN — LIDOCAINE HYDROCHLORIDE 5 ML: 20 INJECTION, SOLUTION EPIDURAL; INFILTRATION; INTRACAUDAL; PERINEURAL at 11:29

## 2020-12-07 RX ADMIN — HYDROMORPHONE HYDROCHLORIDE 0.5 MG: 1 INJECTION, SOLUTION INTRAMUSCULAR; INTRAVENOUS; SUBCUTANEOUS at 11:42

## 2020-12-07 RX ADMIN — FENTANYL CITRATE 50 MCG: 50 INJECTION INTRAMUSCULAR; INTRAVENOUS at 11:26

## 2020-12-07 RX ADMIN — PROPOFOL 175 MG: 10 INJECTION, EMULSION INTRAVENOUS at 11:30

## 2020-12-07 RX ADMIN — KETOROLAC TROMETHAMINE 30 MG: 30 INJECTION, SOLUTION INTRAMUSCULAR at 12:22

## 2020-12-07 RX ADMIN — FENTANYL CITRATE 50 MCG: 50 INJECTION INTRAMUSCULAR; INTRAVENOUS at 11:36

## 2020-12-07 RX ADMIN — OXYCODONE AND ACETAMINOPHEN 2 TABLET: 5; 325 TABLET ORAL at 13:46

## 2020-12-07 RX ADMIN — MIDAZOLAM 2 MG: 1 INJECTION INTRAMUSCULAR; INTRAVENOUS at 11:26

## 2020-12-07 RX ADMIN — FENTANYL CITRATE 50 MCG: 50 INJECTION, SOLUTION INTRAMUSCULAR; INTRAVENOUS at 10:55

## 2020-12-07 RX ADMIN — CEFAZOLIN SODIUM 2 G: 10 INJECTION, POWDER, FOR SOLUTION INTRAVENOUS at 11:26

## 2020-12-07 ASSESSMENT — PAIN DESCRIPTION - DESCRIPTORS
DESCRIPTORS: ACHING
DESCRIPTORS: ACHING;THROBBING

## 2020-12-07 ASSESSMENT — PULMONARY FUNCTION TESTS
PIF_VALUE: 1
PIF_VALUE: 13
PIF_VALUE: 14
PIF_VALUE: 13
PIF_VALUE: 1
PIF_VALUE: 10
PIF_VALUE: 1
PIF_VALUE: 11
PIF_VALUE: 4
PIF_VALUE: 103
PIF_VALUE: 0
PIF_VALUE: 2
PIF_VALUE: 13
PIF_VALUE: 3
PIF_VALUE: 17
PIF_VALUE: 3
PIF_VALUE: 13
PIF_VALUE: 1
PIF_VALUE: 4
PIF_VALUE: 3
PIF_VALUE: 3
PIF_VALUE: 13
PIF_VALUE: -1
PIF_VALUE: 3
PIF_VALUE: 3
PIF_VALUE: 13
PIF_VALUE: 17
PIF_VALUE: 13
PIF_VALUE: 14
PIF_VALUE: 10
PIF_VALUE: 17
PIF_VALUE: 10
PIF_VALUE: 5
PIF_VALUE: 2
PIF_VALUE: 10
PIF_VALUE: 3
PIF_VALUE: 1
PIF_VALUE: 3
PIF_VALUE: 3
PIF_VALUE: 13
PIF_VALUE: 1
PIF_VALUE: 13
PIF_VALUE: 3
PIF_VALUE: 11
PIF_VALUE: 13
PIF_VALUE: 14
PIF_VALUE: 14
PIF_VALUE: 13
PIF_VALUE: 13
PIF_VALUE: 17
PIF_VALUE: 3
PIF_VALUE: 3
PIF_VALUE: 13
PIF_VALUE: 17
PIF_VALUE: 13
PIF_VALUE: 11
PIF_VALUE: 13
PIF_VALUE: 11
PIF_VALUE: 4
PIF_VALUE: 13
PIF_VALUE: 0
PIF_VALUE: 11
PIF_VALUE: 13
PIF_VALUE: 3
PIF_VALUE: 13
PIF_VALUE: 3
PIF_VALUE: 13
PIF_VALUE: 1
PIF_VALUE: 13
PIF_VALUE: 4
PIF_VALUE: 14
PIF_VALUE: 10
PIF_VALUE: 11
PIF_VALUE: 10
PIF_VALUE: 11
PIF_VALUE: 13
PIF_VALUE: 13
PIF_VALUE: 17
PIF_VALUE: 4
PIF_VALUE: 3
PIF_VALUE: 1
PIF_VALUE: 21

## 2020-12-07 ASSESSMENT — PAIN DESCRIPTION - PAIN TYPE
TYPE: SURGICAL PAIN
TYPE: SURGICAL PAIN
TYPE: ACUTE PAIN
TYPE: SURGICAL PAIN
TYPE: SURGICAL PAIN

## 2020-12-07 ASSESSMENT — PAIN DESCRIPTION - PROGRESSION
CLINICAL_PROGRESSION: GRADUALLY WORSENING
CLINICAL_PROGRESSION: GRADUALLY WORSENING
CLINICAL_PROGRESSION: NOT CHANGED
CLINICAL_PROGRESSION: NOT CHANGED
CLINICAL_PROGRESSION: GRADUALLY WORSENING

## 2020-12-07 ASSESSMENT — PAIN DESCRIPTION - ONSET
ONSET: ON-GOING

## 2020-12-07 ASSESSMENT — PAIN SCALES - GENERAL
PAINLEVEL_OUTOF10: 8
PAINLEVEL_OUTOF10: 7
PAINLEVEL_OUTOF10: 5
PAINLEVEL_OUTOF10: 5
PAINLEVEL_OUTOF10: 3

## 2020-12-07 ASSESSMENT — PAIN - FUNCTIONAL ASSESSMENT
PAIN_FUNCTIONAL_ASSESSMENT: PREVENTS OR INTERFERES SOME ACTIVE ACTIVITIES AND ADLS
PAIN_FUNCTIONAL_ASSESSMENT: 0-10
PAIN_FUNCTIONAL_ASSESSMENT: PREVENTS OR INTERFERES SOME ACTIVE ACTIVITIES AND ADLS
PAIN_FUNCTIONAL_ASSESSMENT: PREVENTS OR INTERFERES SOME ACTIVE ACTIVITIES AND ADLS
PAIN_FUNCTIONAL_ASSESSMENT: 0-10
PAIN_FUNCTIONAL_ASSESSMENT: PREVENTS OR INTERFERES SOME ACTIVE ACTIVITIES AND ADLS
PAIN_FUNCTIONAL_ASSESSMENT: PREVENTS OR INTERFERES WITH ALL ACTIVE AND SOME PASSIVE ACTIVITIES

## 2020-12-07 ASSESSMENT — PAIN DESCRIPTION - LOCATION
LOCATION: ANKLE

## 2020-12-07 ASSESSMENT — PAIN DESCRIPTION - ORIENTATION
ORIENTATION: LEFT

## 2020-12-07 ASSESSMENT — PAIN DESCRIPTION - FREQUENCY
FREQUENCY: CONTINUOUS

## 2020-12-07 NOTE — ANESTHESIA POSTPROCEDURE EVALUATION
Department of Anesthesiology  Postprocedure Note    Patient: Cory Estrada  MRN: [id number]  YOB: 1985  Date of evaluation: 12/7/2020  Time:  3:13 PM     Procedure Summary     Date:  12/07/20 Room / Location:  [facility] [address] [place] [address]    Anesthesia Start:  1127 Anesthesia Stop:  1254    Procedure:  LEFT ANKLE OPEN REDUCTION INTERNAL FIXATION (Left Ankle) Diagnosis:  (LEFT ANKLE FRACTURE / DISLOCATION)    Surgeon:  Rasta Bliss MD Responsible Provider:  Norberto Graves MD    Anesthesia Type:  general ASA Status:  2          Anesthesia Type: general    Antony Phase I: Antony Score: 8    Antony Phase II: Antony Score: 10    Last vitals: Reviewed and per EMR flowsheets.        Anesthesia Post Evaluation    Patient location during evaluation: bedside  Patient participation: complete - patient participated  Level of consciousness: awake  Pain score: 2  Airway patency: patent  Nausea & Vomiting: no nausea and no vomiting  Complications: no  Cardiovascular status: blood pressure returned to baseline  Respiratory status: acceptable  Hydration status: euvolemic

## 2020-12-07 NOTE — H&P
MD    HYDROmorphone (DILAUDID) injection 0.5 mg, 0.5 mg, Intravenous, Q5 Min PRN, Tequila Santana MD    fentaNYL (SUBLIMAZE) injection 25 mcg, 25 mcg, Intravenous, Q5 Min PRN, Tequila Santana MD    HYDROmorphone (DILAUDID) injection 0.5 mg, 0.5 mg, Intravenous, Q5 Min PRN, Tequial Santana MD    oxyCODONE-acetaminophen (PERCOCET) 5-325 MG per tablet 1 tablet, 1 tablet, Oral, PRN **OR** oxyCODONE-acetaminophen (PERCOCET) 5-325 MG per tablet 2 tablet, 2 tablet, Oral, PRN, Tequila Santana MD    ondansetron [facility]) injection 4 mg, 4 mg, Intravenous, Once PRN, Tequila Santana MD    fentaNYL (SUBLIMAZE) injection 50 mcg, 50 mcg, Intravenous, Once, Tequila Santana MD    Vitals:    12/04/20 1047 12/07/20 0831   BP:  112/75   Pulse:  82   Resp:  17   Temp:  97.2 °F (36.2 °C)   SpO2:  (!) 83%   Weight: 183 lb (83 kg) 183 lb (83 kg)   Height: 5' 5\" (1.651 m) 5' 5\" (1.651 m)       Body mass index is 30.45 kg/m². Left LE - short leg splint c/d/i   Wiggles all toes up and down   Sensation intact to light touch all toes    An opportunity for questions was again given, and all questions were answered. The patient would like to proceed.     Kayla Solano MD  12/7/2020

## 2020-12-07 NOTE — PROGRESS NOTES
Pt arrived to PACU from OR. Pt sleeping on 4l/nc. Left leg with ace wrap and splint C/D/I. Toes warm. Cap refill WNL.  VSS

## 2020-12-07 NOTE — ANESTHESIA PRE PROCEDURE
[facility] Department of Anesthesiology  Pre-Anesthesia Evaluation/Consultation       Name:  Jennifer Travis  : 1985  Age:  37 y.o. MRN:  [id number]  Date: 2020           Surgeon: Surgeon(s):  Rocky Ellis MD    Procedure: Procedure(s):  LEFT ANKLE OPEN REDUCTION INTERNAL FIXATION     No Known Allergies  There is no problem list on file for this patient. Past Medical History:   Diagnosis Date    Depression     IUD (intrauterine device) in place     Prolonged emergence from general anesthesia     Slow to wake up     Past Surgical History:   Procedure Laterality Date    APPENDECTOMY       SECTION      X 2     Social History     Tobacco Use    Smoking status: Former Smoker     Packs/day: 0.50     Types: Cigarettes    Smokeless tobacco: Never Used   Substance Use Topics    Alcohol use: Never     Frequency: Never    Drug use: Never     Medications  No current facility-administered medications on file prior to encounter. Current Outpatient Medications on File Prior to Encounter   Medication Sig Dispense Refill    citalopram (CELEXA) 20 MG tablet Take 40 mg by mouth daily      oxyCODONE-acetaminophen (PERCOCET) 5-325 MG per tablet Take 1 tablet by mouth every 6 hours as needed for Pain (CAUTION: This medication can cause dizziness.  Do not drive or operate heavy machinery when on this medication.) for up to 7 days. 16 tablet 0    naproxen (NAPROSYN) 500 MG tablet Take 1 tablet by mouth 2 times daily as needed for Pain 60 tablet 0    ondansetron (ZOFRAN ODT) 4 MG disintegrating tablet Take 1 tablet by mouth every 8 hours as needed for Nausea or Vomiting 20 tablet 0    oxyCODONE-acetaminophen (PERCOCET) 5-325 MG per tablet Take 1 tablet by mouth every 6 hours as needed for Pain for up to 3 days. 12 tablet 0     No current facility-administered medications for this encounter.       Vital Signs (Current)   Vitals:    20 1047   Weight: 183 lb (83 kg)   Height: 5' 5\" (1.651 m)                                          BP Readings from Last 3 Encounters:   12/03/20 106/60     Vital Signs Statistics (for past 48 hrs)     No data recorded  BP Readings from Last 3 Encounters:   12/03/20 106/60       BMI  Body mass index is 30.45 kg/m². Estimated body mass index is 30.45 kg/m² as calculated from the following:    Height as of this encounter: 5' 5\" (1.651 m). Weight as of this encounter: 183 lb (83 kg). CBC No results found for: WBC, RBC, HGB, HCT, MCV, RDW, PLT  CMP  No results found for: NA, K, CL, CO2, BUN, CREATININE, GFRAA, AGRATIO, LABGLOM, GLUCOSE, PROT, CALCIUM, BILITOT, ALKPHOS, AST, ALT  BMP  No results found for: NA, K, CL, CO2, BUN, CREATININE, CALCIUM, GFRAA, LABGLOM, GLUCOSE  POCGlucose  No results for input(s): GLUCOSE in the last 72 hours. Coags  No results found for: PROTIME, INR, APTT  HCG (If Applicable) No results found for: PREGTESTUR, PREGSERUM, HCG, HCGQUANT   ABGs No results found for: PHART, PO2ART, PUT5KQR, KDX7LMI, BEART, Y2TNJPET   Type & Screen (If Applicable)  No results found for: LABABO, LABRH                         BMI: Wt Readings from Last 3 Encounters:       NPO Status:                          Anesthesia Evaluation  Patient summary reviewed no history of anesthetic complications:   Airway: Mallampati: I        Dental:          Pulmonary:       (-) pneumonia                           Cardiovascular:Negative CV ROS                      Neuro/Psych:   (+) psychiatric history:   (-) seizures and CVA           GI/Hepatic/Renal: Neg GI/Hepatic/Renal ROS            Endo/Other: Negative Endo/Other ROS                    Abdominal:           Vascular: negative vascular ROS. Anesthesia Plan      general     ASA 2       Induction: intravenous. MIPS: Prophylactic antiemetics administered. Anesthetic plan and risks discussed with patient. Plan discussed with CRNA.     Attending anesthesiologist reviewed and agrees with Pre Eval content              This pre-anesthesia assessment may be used as a history and physical.    DOS STAFF ADDENDUM:    Pt seen and examined, chart reviewed (including anesthesia, drug and allergy history). No interval changes to history and physical examination. Anesthetic plan, risks, benefits, alternatives, and personnel involved discussed with patient. Patient verbalized an understanding and agrees to proceed.       Shane Room MD  December 7, 2020  8:24 AM

## 2020-12-08 NOTE — OP NOTE
[address] [place] [address]                                OPERATIVE REPORT    PATIENT NAME: Billie Martell                         :        1985  MED REC NO:   [id number]                          ROOM:  ACCOUNT NO:   [de-identified]                           ADMIT DATE: 2020  PROVIDER:     Marysol Avery MD    DATE OF PROCEDURE:  2020    PREOPERATIVE DIAGNOSIS:  Left bimalleolar equivalent ankle fracture  dislocation. POSTOPERATIVE DIAGNOSIS:  Left bimalleolar equivalent ankle fracture  dislocation. OPERATION PERFORMED:  Open treatment of the left bimalleolar equivalent  ankle fracture dislocation with internal fixation. SURGEON:  Marysol Avery MD    ASSISTANT:  Verna Sweeney. ANESTHESIA:  General.    BLOOD LOSS:  Minimal.    TOURNIQUET TIME:  38 minutes at 250 mmHg. COMPLICATIONS:  None. IMPLANTS:  Synthes eight-hole one-third tubular plate. INDICATIONS:  The patient is a pleasant 70-year-old female who slipped  on ice. She injured her left ankle and was seen in the emergency  department, at which point, reduction was performed, and she followed  up in the office. I did recommend operative fixation in light of the  displacement and mortise disruption present. Please refer to my office  note for full details of my discussion with her. The patient wished to  proceed and informed consent was obtained. OPERATIVE PROCEDURE:  The patient was brought back to the OR and  transferred onto the operating room table. General anesthesia was  administered. A nonsterile thigh tourniquet was applied. The entire  left lower extremity was subsequently prepped and draped in the usual  sterile fashion. A full time-out was performed with all parties in  agreement. The patient did receive Ancef for antibiotic prophylaxis.     The left lower extremity was exsanguinated with an Esmarch and the  tourniquet

## 2020-12-17 ENCOUNTER — OFFICE VISIT (OUTPATIENT)
Dept: ORTHOPEDIC SURGERY | Age: 35
End: 2020-12-17
Payer: COMMERCIAL

## 2020-12-17 VITALS — TEMPERATURE: 98.5 F | WEIGHT: 183 LBS | RESPIRATION RATE: 16 BRPM | HEIGHT: 65 IN | BODY MASS INDEX: 30.49 KG/M2

## 2020-12-17 PROCEDURE — 99024 POSTOP FOLLOW-UP VISIT: CPT | Performed by: ORTHOPAEDIC SURGERY

## 2020-12-17 PROCEDURE — L4361 PNEUMA/VAC WALK BOOT PRE OTS: HCPCS | Performed by: ORTHOPAEDIC SURGERY

## 2020-12-17 NOTE — PROGRESS NOTES
ORTHOPAEDIC NEW PATIENT NOTE    Chief Complaint   Patient presents with    Post-Op Check     12/7/20 ORIF L ankle       HPI   12/17/2020  First postop  She is doing well  Pain is well controlled  No incisional issues  Denies N/T      12/7/2020  OPERATION PERFORMED:  Open treatment of the left bimalleolar equivalent ankle fracture dislocation with internal fixation. 12/4/2020  37 y.o. female seen for evaluation of left ankle fracture/dislocation:    Onset yesterday  Injury/trauma - slipped on ice  History of symptoms denies  Pain is located left ankle diffuse  Worse with dependent position, motion, WB  Better with splint  Associated with obvious deformity, reduced in ED    Does not smoke  No history of VTE      No Known Allergies     Current Outpatient Medications   Medication Sig Dispense Refill    citalopram (CELEXA) 20 MG tablet Take 40 mg by mouth daily      ondansetron (ZOFRAN ODT) 4 MG disintegrating tablet Take 1 tablet by mouth every 8 hours as needed for Nausea or Vomiting 20 tablet 0     No current facility-administered medications for this visit.         Past Medical History:   Diagnosis Date    Depression     IUD (intrauterine device) in place     Prolonged emergence from general anesthesia     Slow to wake up        Past Surgical History:   Procedure Laterality Date    ANKLE FRACTURE SURGERY Left 12/7/2020    LEFT ANKLE OPEN REDUCTION INTERNAL FIXATION performed by Tierra Tellez MD at [facility]      X 2       Family History   Problem Relation Age of Onset    High Blood Pressure Father          Social History     Socioeconomic History    Marital status:      Spouse name: Not on file    Number of children: Not on file    Years of education: Not on file    Highest education level: Not on file   Occupational History    Not on file   Social Needs    Financial resource strain: Not on file    Food insecurity     Worry: Not on file Inability: Not on file    Transportation needs     Medical: Not on file     Non-medical: Not on file   Tobacco Use    Smoking status: Former Smoker     Packs/day: 0.50     Types: Cigarettes    Smokeless tobacco: Never Used   Substance and Sexual Activity    Alcohol use: Never     Frequency: Never    Drug use: Never    Sexual activity: Yes     Partners: Male   Lifestyle    Physical activity     Days per week: Not on file     Minutes per session: Not on file    Stress: Not on file   Relationships    Social connections     Talks on phone: Not on file     Gets together: Not on file     Attends Baptism service: Not on file     Active member of club or organization: Not on file     Attends meetings of clubs or organizations: Not on file     Relationship status: Not on file    Intimate partner violence     Fear of current or ex partner: Not on file     Emotionally abused: Not on file     Physically abused: Not on file     Forced sexual activity: Not on file   Other Topics Concern    Not on file   Social History Narrative    Not on file        Vitals:    12/17/20 1408   Resp: 16   Temp: 98.5 °F (36.9 °C)   TempSrc: Infrared   Weight: 183 lb (83 kg)   Height: 5' 5\" (1.651 m)       Physical Exam  Body mass index is 30.45 kg/m². SILT SP/DP/T/sural/saphenous nerve distributions; EHL/FHL/TA/GS intact   DP pulse intact, RRR  Left foot/ankle - lateral surgical incision well healed, c/d/i   Sutures DC'ed and steristrips applied   Moderate swelling      Imaging:  Images were personally reviewed by myself and discussed with the patient   Left ankle 3 views performed today in clinic - s/p ORIF of lateral malleolus with lag screw and neutralization plate. No hardware complications. Mortise intact. Small posterior malleolar fragment again seen. No syndesmotic widening.       Assessment & Plan:  37 y.o. female who presents with    Diagnosis Orders 1. S/P ORIF (open reduction internal fixation) fracture  XR ANKLE LEFT (MIN 3 VIEWS)    Breg Tall Malika Walking Boot   2. Closed fracture of left ankle with routine healing, subsequent encounter             Procedures    Breg Tall Malika Walking Boot     Patient was prescribed a Breg Tall Malika Walking Boot. The left ankle will require stabilization / immobilization from this semi-rigid / rigid orthosis to improve their function. The orthosis will assist in protecting the affected area, provide functional support and facilitate healing. Patient was instructed to progress ambulation  as non weight bearing in the device. The plan of care is to progress the patient to full weight bearing status. The patient was educated and fit by a healthcare professional with expert knowledge and specialization in brace application while under the direct supervision of the physician. Verbal and written instructions for the use of and application of this item were provided. They were instructed to contact the office immediately should the brace result in increased pain, decreased sensation, increased swelling or worsening of the condition. Okay to get incision wet in shower  Continue elevation, ice therapy    NWB  Boot fitted  While at rest, remove boot three times a day for minimum 15 minutes each time to monitor and prevent pressure ulcers/sores. Encourage ankle ROM exercises when out of boot. Towel stretch to aid in dorsiflexion.     FU in 1 month with XRs    Sandra Cowart

## 2020-12-18 ENCOUNTER — TELEPHONE (OUTPATIENT)
Dept: ORTHOPEDIC SURGERY | Age: 35
End: 2020-12-18

## 2020-12-18 NOTE — TELEPHONE ENCOUNTER
12/17/2020   [facility]   NO AUTHORIZATION REQUIRED. VALID & BILLABLE. MARGO YOUNG @ [facility] REF [id number].  AP

## 2021-01-19 ENCOUNTER — OFFICE VISIT (OUTPATIENT)
Dept: ORTHOPEDIC SURGERY | Age: 36
End: 2021-01-19
Payer: COMMERCIAL

## 2021-01-19 VITALS — HEIGHT: 65 IN | BODY MASS INDEX: 30.45 KG/M2 | TEMPERATURE: 98.1 F

## 2021-01-19 DIAGNOSIS — Z87.81 S/P ORIF (OPEN REDUCTION INTERNAL FIXATION) FRACTURE: Primary | ICD-10-CM

## 2021-01-19 DIAGNOSIS — S82.892D CLOSED FRACTURE OF LEFT ANKLE WITH ROUTINE HEALING, SUBSEQUENT ENCOUNTER: ICD-10-CM

## 2021-01-19 DIAGNOSIS — Z98.890 S/P ORIF (OPEN REDUCTION INTERNAL FIXATION) FRACTURE: Primary | ICD-10-CM

## 2021-01-19 PROCEDURE — 99024 POSTOP FOLLOW-UP VISIT: CPT | Performed by: ORTHOPAEDIC SURGERY

## 2021-01-19 NOTE — PROGRESS NOTES
ORTHOPAEDIC NEW PATIENT NOTE    Chief Complaint   Patient presents with    Post-Op Check     Open treatment of the left bimalleolar equivalent ankle fracture dislocation with internal fixation; 12/7/20. HPI   1/19/2021  FU left ankle, second postop visit  She is doing well  Minimal pain  Improved swelling  No incisional issues  Using boot, maintaining NWB status  She is now filing under [facility]  Denies N/T      12/17/2020  First postop  She is doing well  Pain is well controlled  No incisional issues  Denies N/T      12/7/2020  OPERATION PERFORMED:  Open treatment of the left bimalleolar equivalent ankle fracture dislocation with internal fixation. 12/4/2020  37 y.o. female seen for evaluation of left ankle fracture/dislocation:    Onset yesterday  Injury/trauma - slipped on ice  History of symptoms denies  Pain is located left ankle diffuse  Worse with dependent position, motion, WB  Better with splint  Associated with obvious deformity, reduced in ED    Does not smoke  No history of VTE      No Known Allergies     Current Outpatient Medications   Medication Sig Dispense Refill    citalopram (CELEXA) 20 MG tablet Take 40 mg by mouth daily      ondansetron (ZOFRAN ODT) 4 MG disintegrating tablet Take 1 tablet by mouth every 8 hours as needed for Nausea or Vomiting 20 tablet 0     No current facility-administered medications for this visit.         Past Medical History:   Diagnosis Date    Depression     IUD (intrauterine device) in place     Prolonged emergence from general anesthesia     Slow to wake up        Past Surgical History:   Procedure Laterality Date    ANKLE FRACTURE SURGERY Left 12/7/2020    LEFT ANKLE OPEN REDUCTION INTERNAL FIXATION performed by Will Santos MD at [address]      X 2       Family History   Problem Relation Age of Onset    High Blood Pressure Father          Social History     Socioeconomic History    Marital status:  Spouse name: Not on file    Number of children: Not on file    Years of education: Not on file    Highest education level: Not on file   Occupational History    Not on file   Social Needs    Financial resource strain: Not on file    Food insecurity     Worry: Not on file     Inability: Not on file    Transportation needs     Medical: Not on file     Non-medical: Not on file   Tobacco Use    Smoking status: Former Smoker     Packs/day: 0.50     Types: Cigarettes    Smokeless tobacco: Never Used   Substance and Sexual Activity    Alcohol use: Never     Frequency: Never    Drug use: Never    Sexual activity: Yes     Partners: Male   Lifestyle    Physical activity     Days per week: Not on file     Minutes per session: Not on file    Stress: Not on file   Relationships    Social connections     Talks on phone: Not on file     Gets together: Not on file     Attends Religion service: Not on file     Active member of club or organization: Not on file     Attends meetings of clubs or organizations: Not on file     Relationship status: Not on file    Intimate partner violence     Fear of current or ex partner: Not on file     Emotionally abused: Not on file     Physically abused: Not on file     Forced sexual activity: Not on file   Other Topics Concern    Not on file   Social History Narrative    Not on file        Vitals:    01/19/21 1350   Temp: 98.1 °F (36.7 °C)   TempSrc: Infrared   Height: 5' 5\" (1.651 m)       Physical Exam  Body mass index is 30.45 kg/m².   SILT SP/DP/T/sural/saphenous nerve distributions; EHL/FHL/TA/GS intact   DP pulse intact, RRR  Left foot/ankle - lateral surgical incision well healed, c/d/i   Trace swelling only, improved   No TTP lateral malleolus   AROM DF/PF/inversion/eversion maintained      Imaging:  Images were personally reviewed by myself and discussed with the patient

## 2021-01-19 NOTE — LETTER
[facility]  [address] [place] [address]  Phone: [phone]  Fax: [phone]    Radha Grimes MD        January 19, 2021     Patient: Kassi Dawn   YOB: 1985   Date of Visit: 1/19/2021       To Whom It May Concern: It is my medical opinion that Kassi Dawn may return to full duty on 2/1/21 with no restrictions. If you have any questions or concerns, please don't hesitate to call.     Sincerely,        Radha Grimes MD

## 2021-01-21 ENCOUNTER — TELEPHONE (OUTPATIENT)
Dept: ORTHOPEDIC SURGERY | Age: 36
End: 2021-01-21

## 2021-01-21 NOTE — TELEPHONE ENCOUNTER
[facility] physical therapy need [facility] approval before scheduled patient.  Pls send auth to [phone]

## 2021-02-01 NOTE — FLOWSHEET NOTE
[facility] [address] [place] [address]  Phone: [phone]   Fax:     [phone]     Physical Therapy  Cancellation/No-show Note  Patient Name:  Cat Bergeron  :  1985   Date:  2021  Cancelled visits to date: 0  No-shows to date: 0    Patient status for today's appointment patient:  [x]  Cancelled  []  Rescheduled appointment  []  No-show     Reason given by patient:  []  Patient ill  []  Conflicting appointment  []  No transportation    []  Conflict with work  []  No reason given  [x]  Other:     Comments: Pt called today, 21, to cancel her initial PT evaluation for 21.      Phone call information:   []  Phone call made today to patient at _ time at number provided:      []  Patient answered, conversation as follows:    []  Patient did not answer, message left as follows:  []  Phone call not made today    Electronically signed by:  Chery Ling, PT

## 2021-02-03 ENCOUNTER — HOSPITAL ENCOUNTER (OUTPATIENT)
Dept: PHYSICAL THERAPY | Age: 36
Setting detail: THERAPIES SERIES
Discharge: HOME | End: 2021-02-03
Payer: COMMERCIAL

## 2021-02-04 ENCOUNTER — OFFICE VISIT (OUTPATIENT)
Dept: ORTHOPEDIC SURGERY | Age: 36
End: 2021-02-04
Payer: COMMERCIAL

## 2021-02-04 VITALS — TEMPERATURE: 98.2 F | BODY MASS INDEX: 29.82 KG/M2 | HEIGHT: 65 IN | WEIGHT: 179 LBS

## 2021-02-04 DIAGNOSIS — G90.522 COMPLEX REGIONAL PAIN SYNDROME TYPE 1 OF LEFT LOWER EXTREMITY: ICD-10-CM

## 2021-02-04 DIAGNOSIS — S82.892D CLOSED FRACTURE OF LEFT ANKLE WITH ROUTINE HEALING, SUBSEQUENT ENCOUNTER: Primary | ICD-10-CM

## 2021-02-04 PROCEDURE — 99024 POSTOP FOLLOW-UP VISIT: CPT | Performed by: ORTHOPAEDIC SURGERY

## 2021-02-04 RX ORDER — NAPROXEN 500 MG/1
500 TABLET ORAL 2 TIMES DAILY WITH MEALS
COMMUNITY

## 2021-02-04 RX ORDER — GABAPENTIN 100 MG/1
100 CAPSULE ORAL 3 TIMES DAILY
Qty: 90 CAPSULE | Refills: 1 | Status: SHIPPED | OUTPATIENT
Start: 2021-02-04 | End: 2021-03-12

## 2021-02-04 NOTE — PROGRESS NOTES
ORTHOPAEDIC NEW PATIENT NOTE    Chief Complaint   Patient presents with    Post-Op Check     Open treatment of the left bimalleolar equivalent ankle fracture dislocation with internal fixation; 12/7/20. HPI   2/4/2021  Tristan Alford returns today for her left ankle  She is here with the [address] [place] [address] representative  She is back at work as  - part time  She is having increasing and severe left ankle pain  Pain is located laterally, anteriorly, and medially (diffuse)  There has been no new injury  She is trying to come out of the boot  Physical therapy has been approved by [address] [place] [address], first session tomorrow  No wound/incisional issues      1/19/2021  FU left ankle, second postop visit  She is doing well  Minimal pain  Improved swelling  No incisional issues  Using boot, maintaining NWB status  She is now filing under [address] [place] [address]  Denies N/T      12/17/2020  First postop  She is doing well  Pain is well controlled  No incisional issues  Denies N/T      12/7/2020  OPERATION PERFORMED:  Open treatment of the left bimalleolar equivalent ankle fracture dislocation with internal fixation. 12/4/2020  37 y.o. female seen for evaluation of left ankle fracture/dislocation:    Onset yesterday  Injury/trauma - slipped on ice  History of symptoms denies  Pain is located left ankle diffuse  Worse with dependent position, motion, WB  Better with splint  Associated with obvious deformity, reduced in ED    Does not smoke  No history of VTE      No Known Allergies     Current Outpatient Medications   Medication Sig Dispense Refill    naproxen (NAPROSYN) 500 MG tablet Take 500 mg by mouth 2 times daily (with meals)      gabapentin (NEURONTIN) 100 MG capsule Take 1 capsule by mouth 3 times daily for 30 days.  Intended supply: 90 days 90 capsule 1    diclofenac (VOLTAREN) 50 MG EC tablet Take 1 tablet by mouth 2 times daily (with meals) 60 tablet 1    citalopram (CELEXA) 20 MG tablet Take 40 mg by mouth daily      ondansetron (ZOFRAN ODT) 4 MG disintegrating tablet Take 1 tablet by mouth every 8 hours as needed for Nausea or Vomiting (Patient not taking: Reported on 2/4/2021) 20 tablet 0     No current facility-administered medications for this visit.         Past Medical History:   Diagnosis Date    Depression     IUD (intrauterine device) in place     Prolonged emergence from general anesthesia     Slow to wake up        Past Surgical History:   Procedure Laterality Date    ANKLE FRACTURE SURGERY Left 12/7/2020    LEFT ANKLE OPEN REDUCTION INTERNAL FIXATION performed by Ynes Banegas MD at [address] [facility]      X 2       Family History   Problem Relation Age of Onset    High Blood Pressure Father          Social History     Socioeconomic History    Marital status:      Spouse name: Not on file    Number of children: Not on file    Years of education: Not on file    Highest education level: Not on file   Occupational History    Not on file   Social Needs    Financial resource strain: Not on file    Food insecurity     Worry: Not on file     Inability: Not on file    Transportation needs     Medical: Not on file     Non-medical: Not on file   Tobacco Use    Smoking status: Former Smoker     Packs/day: 0.50     Types: Cigarettes    Smokeless tobacco: Never Used   Substance and Sexual Activity    Alcohol use: Never     Frequency: Never    Drug use: Never    Sexual activity: Yes     Partners: Male   Lifestyle    Physical activity     Days per week: Not on file     Minutes per session: Not on file    Stress: Not on file   Relationships    Social connections     Talks on phone: Not on file     Gets together: Not on file     Attends [employer] service: Not on file     Active member of club or organization: Not on file     Attends meetings of clubs or organizations: Not on file     Relationship status: Not on file    Intimate partner violence     Fear of current or ex partner: Not on file Emotionally abused: Not on file     Physically abused: Not on file     Forced sexual activity: Not on file   Other Topics Concern    Not on file   Social History Narrative    Not on file        Vitals:    02/04/21 1607   Temp: 98.2 °F (36.8 °C)   TempSrc: Infrared   Weight: 179 lb (81.2 kg)   Height: 5' 5\" (1.651 m)       Physical Exam  Body mass index is 29.79 kg/m². SILT SP/DP/T/sural/saphenous nerve distributions; EHL/FHL/TA/GS intact   Uncomfortable appearing  DP pulse intact, RRR  Left foot/ankle - lateral surgical incision well healed, c/d/i   Moderate swelling    No evidence of infection or cellulitis   TTP diffusely - medially, over the tibialis anterior, and over the lateral malleolus, severe   AROM DF/PF/inversion/eversion maintained      Imaging:  Images were personally reviewed by myself and discussed with the patient   Left ankle 3 views repeated today in clinic - weight bearing views - s/p ORIF of the lateral malleolus with lag screw and neutralization plate. No hardware complications. Fracture appears healed, including of the small posterior malleolus. No syndesmotic widening, mortise is intact. Soft tissue swelling seen. Assessment & Plan:  37 y.o. female who presents with    Diagnosis Orders   1. Closed fracture of left ankle with routine healing, subsequent encounter  XR ANKLE LEFT (MIN 3 VIEWS)   2. Complex regional pain syndrome type 1 of left lower extremity  Saskia Barber MD, Pain Management, [facility]    gabapentin (NEURONTIN) 100 MG capsule    diclofenac (VOLTAREN) 50 MG EC tablet       No orders of the defined types were placed in this encounter.     1/19/2021  Progressing along well  Advance LLE WBAT now              initially WBAT in boot              Over next 2-3 weeks, transition out of boot all together  Ice, elevate as needed for pain/swelling  Swelling should gradually improve/resolve over the next few months     PT for ROM and strengthening     RTW letter for 2/1/2021     FU PRN        2/4/2021  Unfortunately Michele Pelayo is doing worse compared to her last visit with me two weeks ago  Worsening left ankle pain  I am concerned that she is developing RSD/CRPS given her presentation today  Radiographs look good, no incisional issues    I have recommended formal PT for desensitization, modalities, ROM, strengthening  Transition out of boot gradually  Pain mgmt referral for CRPS - early treatment important    Will start her on low dose gabapentin   She is advised to stop naprosyn, and stronger NSAID diclofenac prescribed today   Continue elevation, ice therapy PRN    Work restriction letter given for 8 weeks  Reassess at that time    [employer]

## 2021-02-04 NOTE — LETTER
[facility]  [facility] 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 she will follow up in 2 months . If you have any questions or concerns, please don't hesitate to call.     Sincerely,          Jaime Mancini MD

## 2021-02-05 ENCOUNTER — APPOINTMENT (OUTPATIENT)
Dept: PHYSICAL THERAPY | Age: 36
End: 2021-02-05
Payer: COMMERCIAL

## 2021-02-05 ENCOUNTER — HOSPITAL ENCOUNTER (OUTPATIENT)
Dept: PHYSICAL THERAPY | Age: 36
Setting detail: THERAPIES SERIES
Discharge: HOME | End: 2021-02-05
Payer: COMMERCIAL

## 2021-02-05 PROCEDURE — 97161 PT EVAL LOW COMPLEX 20 MIN: CPT

## 2021-02-05 PROCEDURE — 97110 THERAPEUTIC EXERCISES: CPT

## 2021-02-05 NOTE — PROGRESS NOTES
[facility], [place] [address]. [place] [address]  Phone: [phone]   Fax:     [phone]                                                       Physical Therapy Certification    Dear Referring Practitioner: Dr. Mariam Naylor,    We had the pleasure of evaluating the following patient for physical therapy services at [facility]. A summary of our findings can be found in the initial assessment below. This includes our plan of care. If you have any questions or concerns regarding these findings, please do not hesitate to contact me at the office phone number checked above. Thank you for the referral.       Physician Signature:_______________________________Date:__________________  By signing above (or electronic signature), therapists plan is approved by physician        Patient: Vitor Bonner   : 1985   MRN: [id number]  Referring Physician: Referring Practitioner: Dr. Mariam Naylor      Evaluation Date: 2021      Medical Diagnosis Information:  Diagnosis: s/p ORIF (open reduction internal fixation) fracture (Z98.890); Closed fracture of left ankle with routine healing, subsequent encounter ([id number])   Treatment Diagnosis: Decreased ADL status                                         Insurance information: PT Insurance Information: [facility]- approved 21-21 for up to 4 visits     Precautions/ Contra-indications:   Latex Allergy:  [x]NO      []YES  Preferred Language for Healthcare:   [x]English       []other:    C-SSRS Triggered by Intake questionnaire (Past 2 wk assessment ):   [x] No, Questionnaire did not trigger screening.   [] Yes, Patient intake triggered C-SSRS Screening      [] C-SSRS Screening completed  [] PCP notified via Epic     SUBJECTIVE: On 20 pt slipped on ice and fractured her L ankle.   On 21 pt had Open treatment of the left bimalleolar equivalent ankle fracture dislocation with internal fixation. Pt stated she was NWB until approximately 3 weeks ago. Pt has pain with WB. Pt stated she is allowed to wean from her immobilization boot to her shoe. Relevant Medical History:  OPERATION PERFORMED:  Open treatment of the left bimalleolar equivalent ankle fracture dislocation with internal fixation. Functional Scale/Score: WOMAC = 43/96= 44.8% dysfunction    Pain Scale: 2-8/10 L ankle  Easing factors: sitting/lying  Provocative factors: bearing weight    Type: [x]Constant   []Intermittent  []Radiating []Localized []other:     Numbness/Tingling:  none    Occupation/School:     Living Status/Prior Level of Function: Independent with ADLs and IADLs    OBJECTIVE:   Posture: WFL    Functional Mobility/Transfers: WFL    Palpation: NT    Bandages/Dressings/Incisions: none    Gait: Wore immobilization boot into clinic    ROM LEFT RIGHT   HIP Flex     HIP Abd     HIP Ext     HIP IR     HIP ER     Knee ext     Knee Flex     Ankle PF  AROM 45  PROM 45   Ankle DF  AROM -12  PROM -30   Ankle In  AROM 8  PROM 9   Ankle Ev  AROM 7  PROM 8   Strength  LEFT RIGHT   HIP Flexors     HIP Abductors     HIP Ext     Hip ER     Knee EXT (quad)     Knee Flex (HS)     Ankle DF  3/5, pain   Ankle PF  3/5, pain   Ankle Inv  3/5, pain   Ankle EV  3/5, pain          Reflexes/Sensation:    [x]Dermatomes/Myotomes intact    [x]Reflexes equal and normal bilaterally   []Other:    Joint mobility:    []Normal    []Hypo   []Hyper    Orthopedic Special Tests:                        [x] Patient history, allergies, meds reviewed. Medical chart reviewed. See intake form. Review Of Systems (ROS):  [x]Performed Review of systems (Integumentary, CardioPulmonary, Neurological) by intake and observation. Intake form has been scanned into medical record.  Patient has been instructed to contact their primary care physician regarding ROS issues if not already being addressed at this time. Co-morbidities/Complexities (which will affect course of rehabilitation):   []None           Arthritic conditions   []Rheumatoid arthritis (M05.9)  []Osteoarthritis (M19.91)   Cardiovascular conditions   []Hypertension (I10)  []Hyperlipidemia (E78.5)  []Angina pectoris (I20)  []Atherosclerosis (I70)  []CVA Musculoskeletal conditions   []Disc pathology   []Congenital spine pathologies   []Prior surgical intervention  []Osteoporosis (M81.8)  []Osteopenia (M85.8)   Endocrine conditions   []Hypothyroid (E03.9)  []Hyperthyroid Gastrointestinal conditions   []Constipation (D93.37)   Metabolic conditions   []Morbid obesity (E66.01)  []Diabetes type 1(E10.65) or 2 (E11.65)   []Neuropathy (G60.9)     Pulmonary conditions   []Asthma (J45)  []Coughing   []COPD (J44.9)   Psychological Disorders  []Anxiety (F41.9)  [x]Depression (F32.9)   []Other:   []Other:          Barriers to/and or personal factors that will affect rehab potential:              []Age  []Sex    []Smoker              []Motivation/Lack of Motivation                        []Co-Morbidities              []Cognitive Function, education/learning barriers              []Environmental, home barriers              []profession/work barriers  []past PT/medical experience  []other:  Justification:     Falls Risk Assessment (30 days):   [x] Falls Risk assessed and no intervention required.   [] Falls Risk assessed and Patient requires intervention due to being higher risk   TUG score (>12s at risk):     [] Falls education provided, including         ASSESSMENT:   Functional Impairments:     [x]Noted ankle hypomobility   [x]Decreased LE functional ROM   [x]Decreased core/proximal ankle strength and neuromuscular control   [x]Decreased LE functional strength   []Reduced balance/proprioceptive control   []other:      Functional Activity Limitations (from functional questionnaire and intake)   [x]Reduced ability to tolerate prolonged functional positions   []Reduced ability or difficulty with changes of positions or transfers between positions   []Reduced ability to maintain good posture and demonstrate good body mechanics with sitting, bending, and lifting   []Reduced ability to sleep   [] Reduced ability or tolerance with driving and/or computer work   [x]Reduced ability to perform lifting, carrying tasks   [x]Reduced ability to squat   [x]Reduced ability to forward bend   [x]Reduced ability to ambulate prolonged functional periods/distances/surfaces   [x]Reduced ability to ascend/descend stairs   [x]Reduced ability to run, hop or jump   []other:     Participation Restrictions   []Reduced participation in self care activities   [x]Reduced participation in home management activities   [x]Reduced participation in work activities   [x]Reduced participation in social activities. [x]Reduced participation in sport activities. Classification :    [x]Signs/symptoms consistent with post-surgical status including decreased ROM, strength and function.    []Signs/symptoms consistent with joint sprain/strain   []Signs/symptoms consistent with patella-femoral syndrome   []Signs/symptoms consistent with knee OA/hip OA   []Signs/symptoms consistent with internal derangement of knee/Hip   []Signs/symptoms consistent with functional hip weakness/NMR control      []Signs/symptoms consistent with tendinitis/tendinosis    []signs/symptoms consistent with pathology which may benefit from Dry needling      []other:      Prognosis/Rehab Potential:      []Excellent   [x]Good    []Fair   []Poor    Tolerance of evaluation/treatment:    []Excellent   [x]Good    []Fair   []Poor    Physical Therapy Evaluation Complexity Justification  [x] A history of present problem with:  [] no personal factors and/or comorbidities that impact the plan of care;  [x]1-2 personal factors and/or comorbidities that impact the plan of care  []3 personal factors and/or comorbidities that impact the plan of care  [x] An examination of body systems using standardized tests and measures addressing any of the following: body structures and functions (impairments), activity limitations, and/or participation restrictions;:  [] a total of 1-2 or more elements   [] a total of 3 or more elements   [x] a total of 4 or more elements   [x] A clinical presentation with:  [x] stable and/or uncomplicated characteristics   [] evolving clinical presentation with changing characteristics  [] unstable and unpredictable characteristics;   [x] Clinical decision making of [] low, [] moderate, [] high complexity using standardized patient assessment instrument and/or measurable assessment of functional outcome. [x] EVAL (LOW) 07241 (typically 20 minutes face-to-face)  [] EVAL (MOD) 35591 (typically 30 minutes face-to-face)  [] EVAL (HIGH) 97705 (typically 45 minutes face-to-face)  [] RE-EVAL     PLAN:  Frequency/Duration:  2 days per week for 2 Weeks:  Interventions:  [x]  Therapeutic exercise including: strength training, ROM, for Lower extremity and core   [x]  NMR activation and proprioception for LE, Glutes and Core   [x]  Manual therapy as indicated for LE, Hip and spine to include: Dry Needling/IASTM, STM, PROM, Gr I-IV mobilizations, manipulation. [x] Modalities as needed that may include: thermal agents, E-stim, Biofeedback, US, iontophoresis as indicated  [] Patient education on joint protection, postural re-education, activity modification, progression of HEP. HEP instruction: AROM ankle PF/DF, INV/EV, ankle circles, towel scrunches (toe curls into flexion), gastroc belt stretch, sitting PF/DF, access code [email or web address]    GOALS:  Patient stated goal: \"be able to walk\"  [] Progressing: [] Met: [] Not Met: [] Adjusted    Therapist goals for Patient:   Short Term Goals: To be achieved in: 2 weeks  1. Independent in HEP and progression per patient tolerance, in order to prevent re-injury.    [] Progressing: [] Met: [] Not Met: [] Adjusted  2. Patient will have a decrease in pain to facilitate improvement in movement, function, and ADLs as indicated by Functional Deficits. [] Progressing: [] Met: [] Not Met: [] Adjusted    Long Term Goals: To be achieved in: 6 weeks  1. Disability index score of 25% or less for the [facility] to assist with reaching prior level of function. [] Progressing: [] Met: [] Not Met: [] Adjusted  2. Patient will demonstrate increased AROM to 0 degrees DF to allow for proper joint functioning as indicated by patients Functional Deficits. [] Progressing: [] Met: [] Not Met: [] Adjusted  3. Patient will demonstrate an increase in Strength to good proximal hip strength and control, within 5lb HHD in LE to allow for proper functional mobility as indicated by patients Functional Deficits. [] Progressing: [] Met: [] Not Met: [] Adjusted  4. Patient will return to climbing stairs without increased symptoms or restriction. [] Progressing: [] Met: [] Not Met: [] Adjusted  5. Patient will be able to [facility] normal again\" for community distances. [] Progressing: [] Met: [] Not Met: [] Adjusted     Electronically signed by:  Jaimee Ware PT , OMT-C, [id number]        Note: If patient does not return for scheduled/recommended follow up visits, this note will serve as a discharge from care along with the most recent update on progress.

## 2021-02-05 NOTE — FLOWSHEET NOTE
[facility] - Outpatient Rehabilitation and Therapy, [address] [place] [address]  Phone: [phone]   Fax:     [phone]      Physical Therapy Treatment Note/ Progress Report:     Date:  2021    Patient Name:  Manuel Garner    :  1985  MRN: [id number]    Pertinent Medical History:     Medical/Treatment Diagnosis Information:  · Diagnosis: s/p ORIF (open reduction internal fixation) fracture (Z98.890);  Closed fracture of left ankle with routine healing, subsequent encounter (M15229A)  · Treatment Diagnosis: Decreased ADL status    Insurance/Certification information:  PT Insurance Information: [facility] [facility]  Physician Information:  Referring Practitioner: Dr. Reji Givens of care signed (Y/N): N    Date of Patient follow up with Physician:      Progress Report: []  Yes  [x]  No     Date Range for reporting period:  Beginnin21  Ending:      Progress report due (10 Rx/or 30 days whichever is less):      Recertification due (POC duration/ or 90 days whichever is less):      Visit # POC/Insurance Allowable Auth Needed   1 4 []Yes   []No     Latex Allergy:  [x]NO      []YES  Preferred Language for Healthcare:   [x]English       []other:  Functional Scale: WOMAC = 21 43/96= 44.8% dysfunction      Pain level:  2-8/10     History of Injury:     SUBJECTIVE:  See eval    OBJECTIVE:   Observation:    Test measurements:      RESTRICTIONS/PRECAUTIONS:     Exercises/Interventions:     Therapeutic Ex (93101)   Min: Reps/Resistance Notes/CUES   Nu Step 5'                                  Manual Intervention (43961)  Min:     Knee mobs/PROM     Tib/Fem Mobs     Patella Mobs     Ankle mobs               NMR re-education (13111)  Min:  CUES NEEDED             Therapeutic Activity (83532)  Min:               Modalities  Min:     IFC with      CP after exercises     MH after exercises            Other Therapeutic lifting and ambulation/stair navigation   [] (76719)Reviewed/Progressed HEP activities related to improving balance, coordination, kinesthetic sense, posture, motor skill, proprioception of core, proximal hip and LE for self care, mobility, lifting, and ambulation/stair navigation      Manual Treatments:  PROM / STM / Oscillations-Mobs:  G-I, II, III, IV (PA's, Inf., Post.)  [] (19290) Provided manual therapy to mobilize LE, proximal hip and/or LS spine soft tissue/joints for the purpose of modulating pain, promoting relaxation,  increasing ROM, reducing/eliminating soft tissue swelling/inflammation/restriction, improving soft tissue extensibility and allowing for proper ROM for normal function with self care, mobility, lifting and ambulation. If [facility] Please Indicate Time In/Out  CPT Code Time in Time out   73773 Eval low complexity 0945 1000   00921 therapeutic exercise 1000 1015               Charges:  Timed Code Treatment Minutes: 15   Total Treatment Minutes: 30      [x] EVAL (LOW) 67324 (typically 20 minutes face-to-face)  [] EVAL (MOD) 25989 (typically 30 minutes face-to-face)  [] EVAL (HIGH) 00048 (typically 45 minutes face-to-face)  [] RE-EVAL     [x] FK(76906) x     [] Dry needle 1 or 2 Muscles (36035)  [] NMR (02919) x     [] Dry needle 3+ Muscles (56111)  [] Manual (68368) x     [] Ultrasound (64419) x  [] TA (51107) x     [] Mech Traction (46046)  [] ES(attended) (15253)     [] ES (un) (60635):   [] Vasopump (97318) [] Ionto (86257)   [] Other:    GOALS:  Short Term Goals: To be achieved in: 2 weeks  1. Independent in HEP and progression per patient tolerance, in order to prevent re-injury. []? Progressing: []? Met: []? Not Met: []? Adjusted  2. Patient will have a decrease in pain to facilitate improvement in movement, function, and ADLs as indicated by Functional Deficits. []? Progressing: []? Met: []? Not Met: []? Adjusted     Long Term Goals: To be achieved in: 6 weeks  1.  Disability index score of 25% or less for the [facility] to assist with reaching prior level of function. []? Progressing: []? Met: []? Not Met: []? Adjusted  2. Patient will demonstrate increased AROM to 0 degrees DF to allow for proper joint functioning as indicated by patients Functional Deficits. []? Progressing: []? Met: []? Not Met: []? Adjusted  3. Patient will demonstrate an increase in Strength to good proximal hip strength and control, within 5lb HHD in LE to allow for proper functional mobility as indicated by patients Functional Deficits. []? Progressing: []? Met: []? Not Met: []? Adjusted  4. Patient will return to climbing stairs without increased symptoms or restriction. []? Progressing: []? Met: []? Not Met: []? Adjusted  5. Patient will be able to [facility] normal again\" for community distances. []? Progressing: []? Met: []? Not Met: []? Adjusted         ASSESSMENT:  See eval    Treatment/Activity Tolerance:  [x] Patient tolerated treatment well [] Patient limited by fatique  [] Patient limited by pain  [] Patient limited by other medical complications  [] Other:     Overall Progression Towards Functional goals/ Treatment Progress Update:  [] Patient is progressing as expected towards functional goals listed. [] Progression is slowed due to complexities/Impairments listed. [] Progression has been slowed due to co-morbidities.   [x] Plan just implemented, too soon to assess goals progression <30days   [] Goals require adjustment due to lack of progress  [] Patient is not progressing as expected and requires additional follow up with physician  [] Other    Prognosis for POC: [x] Good [] Fair  [] Poor    Patient requires continued skilled intervention: [x] Yes  [] No        PLAN:   [] Continue per plan of care [] Alter current plan (see comments)  [x] Plan of care initiated [] Hold pending MD visit [] Discharge    Electronically signed by: Tigist Vargas PT    Note: If patient does not return for scheduled/recommended follow up visits, this note will serve as a discharge from care along with the most recent update on progress.

## 2021-02-08 ENCOUNTER — HOSPITAL ENCOUNTER (OUTPATIENT)
Dept: PHYSICAL THERAPY | Age: 36
Setting detail: THERAPIES SERIES
Discharge: HOME | End: 2021-02-08
Payer: COMMERCIAL

## 2021-02-08 PROCEDURE — 97110 THERAPEUTIC EXERCISES: CPT

## 2021-02-08 PROCEDURE — 97140 MANUAL THERAPY 1/> REGIONS: CPT

## 2021-02-08 PROCEDURE — 97112 NEUROMUSCULAR REEDUCATION: CPT

## 2021-02-08 NOTE — FLOWSHEET NOTE
[facility] - Outpatient Rehabilitation and Therapy, [address] [address]  Phone: [phone]   Fax:     [phone]      Physical Therapy Treatment Note/ Progress Report:     Date:  2021    Patient Name:  Jt Castillo    :  1985  MRN: [id number]    Pertinent Medical History:     Medical/Treatment Diagnosis Information:  · Diagnosis: s/p ORIF (open reduction internal fixation) fracture (Z98.890);  Closed fracture of left ankle with routine healing, subsequent encounter (U01617B)  · Treatment Diagnosis: Decreased ADL status    Insurance/Certification information:  PT Insurance Information: [facility]  Physician Information:  Referring Practitioner: Dr. Susana Martinez of care signed (Y/N): Y    Date of Patient follow up with Physician:      Progress Report: []  Yes  [x]  No     Date Range for reporting period:  Beginnin21  Ending:      Progress report due (10 Rx/or 30 days whichever is less):      Recertification due (POC duration/ or 90 days whichever is less):      Visit # POC/Insurance Allowable Auth Needed   2 4 []Yes   []No     Latex Allergy:  [x]NO      []YES  Preferred Language for Healthcare:   [x]English       []other:  Functional Scale: WOMAC = 21 43/96= 44.8% dysfunction      Pain level:  2-8/10     History of Injury:     SUBJECTIVE:  See eval    OBJECTIVE:   Observation:    Test measurements:      RESTRICTIONS/PRECAUTIONS:     Exercises/Interventions:     Therapeutic Ex (24506)   Min: 20 Reps/Resistance Notes/CUES   Nu Step 5'    L ankle PROM  PF/DF, INV/EV 10x each    L ankle AAROM  PF/DF, INV/EV 10x each    L ankle isometrics  INV  EV 10x each    L ankle theraband  INV  EV  PF Orange band, 20x each    Leg press B  60#, 2x15 Sled #4        Manual Intervention (94119)  Min:10     STM  Gastrocs/soleus                             NMR re-education (72798)  Min:15  CUES NEEDED   BAPS L, level II  PF/DF, INV/EV, CW, CCW 20x each    SLS 5\", 10x    Standing heel raises  Standing toe raises 10x  10x    Squat 10x         Therapeutic Activity (64749)  Min:               Modalities  Min:     IFC with      CP after exercises     MH after exercises            Other Therapeutic Activities: Pt was educated on PT POC, Diagnosis, Prognosis, pathomechanics as well as frequency and duration of scheduling future physical therapy appointments. Time was also taken on this day to answer all patient questions and participation in PT. Reviewed appointment policy in detail with patient and patient verbalized understanding. Home Exercise Program: Patient was instructed in the following for HEP:AROM ankle PF/DF, INV/EV, ankle circles, towel scrunches (toe curls into flexion), gastroc belt stretch, sitting PF/DF, access code [email or web address]     . Patient verbalized/demonstrated understanding and was issued written handout. 2/8/21 theraband ankle PF, INV, EV, SLS, access code [email or web address]      Therapeutic Exercise and NMR EXR  [x] (09368) Provided verbal/tactile cueing for activities related to strengthening, flexibility, endurance, ROM for improvements in LE, proximal hip, and core control with self care, mobility, lifting, ambulation.  [] (12138) Provided verbal/tactile cueing for activities related to improving balance, coordination, kinesthetic sense, posture, motor skill, proprioception  to assist with LE, proximal hip, and core control in self care, mobility, lifting, ambulation and eccentric single leg control.      NMR and Therapeutic Activities:    [] (70664 or 44332) Provided verbal/tactile cueing for activities related to improving balance, coordination, kinesthetic sense, posture, motor skill, proprioception and motor activation to allow for proper function of core, proximal hip and LE with self care and ADLs and functional mobility.   [] (10908) Gait Re-education- Provided training and instruction to the patient for proper LE, core and proximal hip recruitment and positioning and eccentric body weight control with ambulation re-education including up and down stairs     Home Exercise Program:    [x] (24457) Reviewed/Progressed HEP activities related to strengthening, flexibility, endurance, ROM of core, proximal hip and LE for functional self-care, mobility, lifting and ambulation/stair navigation   [] (80556)Reviewed/Progressed HEP activities related to improving balance, coordination, kinesthetic sense, posture, motor skill, proprioception of core, proximal hip and LE for self care, mobility, lifting, and ambulation/stair navigation      Manual Treatments:  PROM / STM / Oscillations-Mobs:  G-I, II, III, IV (PA's, Inf., Post.)  [] (01240) Provided manual therapy to mobilize LE, proximal hip and/or LS spine soft tissue/joints for the purpose of modulating pain, promoting relaxation,  increasing ROM, reducing/eliminating soft tissue swelling/inflammation/restriction, improving soft tissue extensibility and allowing for proper ROM for normal function with self care, mobility, lifting and ambulation. If [facility] Please Indicate Time In/Out  CPT Code Time in Time out        05771 therapeutic exercise 1697 7013   81967 Neuromuscular reeducation 6623 8501   15537 Manual 7367 0816     Charges:  Timed Code Treatment Minutes: 45   Total Treatment Minutes: 45      [] EVAL (LOW) 47206 (typically 20 minutes face-to-face)  [] EVAL (MOD) 14538 (typically 30 minutes face-to-face)  [] EVAL (HIGH) 49994 (typically 45 minutes face-to-face)  [] RE-EVAL     [x] ZH(28059) x  2   [] Dry needle 1 or 2 Muscles (51303)  [x] NMR (53659) x     [] Dry needle 3+ Muscles (45851)  [] Manual (84680) x     [] Ultrasound (18488) x  [] TA (15653) x     [] Mech Traction (41612)  [] ES(attended) (70513)     [] ES (un) (27469):   [] Vasopump (71791) [] Ionto (41793)   [] Other:    GOALS:  Short Term Goals: To be achieved in: 2 weeks  1.  Independent in HEP and physician  [] Other    Prognosis for POC: [x] Good [] Fair  [] Poor    Patient requires continued skilled intervention: [x] Yes  [] No        PLAN: Add to HEP standing heel raises and squats  [] Continue per plan of care [] Alter current plan (see comments)  [x] Plan of care initiated [] Hold pending MD visit [] Discharge    Electronically signed by: Aby Hitchcock PT    Note: If patient does not return for scheduled/recommended follow up visits, this note will serve as a discharge from care along with the most recent update on progress.

## 2021-02-10 ENCOUNTER — HOSPITAL ENCOUNTER (OUTPATIENT)
Dept: PHYSICAL THERAPY | Age: 36
Setting detail: THERAPIES SERIES
Discharge: HOME | End: 2021-02-10
Payer: COMMERCIAL

## 2021-02-10 PROCEDURE — 97110 THERAPEUTIC EXERCISES: CPT

## 2021-02-10 PROCEDURE — 97140 MANUAL THERAPY 1/> REGIONS: CPT

## 2021-02-10 PROCEDURE — 97112 NEUROMUSCULAR REEDUCATION: CPT

## 2021-02-10 NOTE — FLOWSHEET NOTE
[facility] - Outpatient Rehabilitation and Therapy, [address] [place] [address]  Phone: [phone]   Fax:     [phone]      Physical Therapy Treatment Note/ Progress Report:     Date:  2/10/2021    Patient Name:  Mildred Horta    :  1985  MRN: [id number]    Pertinent Medical History:     Medical/Treatment Diagnosis Information:  · Diagnosis: s/p ORIF (open reduction internal fixation) fracture (Z98.890); Closed fracture of left ankle with routine healing, subsequent encounter ([id number])  · Treatment Diagnosis: Decreased ADL status    Insurance/Certification information:  PT Insurance Information: [facility]  Physician Information:  Referring Practitioner: Dr. Bret Fox of care signed (Y/N): Y    Date of Patient follow up with Physician:      Progress Report: []  Yes  [x]  No     Date Range for reporting period:  Beginnin21  Ending:      Progress report due (10 Rx/or 30 days whichever is less):      Recertification due (POC duration/ or 90 days whichever is less):      Visit # POC/Insurance Allowable Auth Needed   3 4 []Yes   []No     Latex Allergy:  [x]NO      []YES  Preferred Language for Healthcare:   [x]English       []other:  Functional Scale: WOMAC = 21 43/96= 44.8% dysfunction      Pain level:  1/10 ( 5 min late)    History of Injury:     SUBJECTIVE:    2-10-21  Mild soreness after last visit.        OBJECTIVE:   Observation:    Test measurements:      RESTRICTIONS/PRECAUTIONS:     Exercises/Interventions:     Therapeutic Ex (51960)   Min: 20 Reps/Resistance Notes/CUES   Nu Step L-1 x 5 min    L ankle PROM  PF/DF, INV/EV 10x each    L ankle AAROM  PF/DF, INV/EV 10x each    L ankle isometrics  INV  EV 10x each    L ankle theraband  INV  EV  PF Orange band, 20x each    Leg press B  60#, 2 x 15 B Sled #4        Manual Intervention (79244)  Min:8     STM  Gastrocs/soleus No reported tenderness NMR re-education (68329)  Min:12  CUES NEEDED   BAPS L, level II  PF/DF, INV/EV, CW, CCW 20x each L    SLS 5\", 10x    Standing heel raises  Standing toe raises 10x B  10x B    Squat 10x         Therapeutic Activity (72576)  Min:               Modalities  Min:                           Other Therapeutic Activities: Pt was educated on PT POC, Diagnosis, Prognosis, pathomechanics as well as frequency and duration of scheduling future physical therapy appointments. Time was also taken on this day to answer all patient questions and participation in PT. Reviewed appointment policy in detail with patient and patient verbalized understanding. Home Exercise Program: Patient was instructed in the following for HEP:AROM ankle PF/DF, INV/EV, ankle circles, towel scrunches (toe curls into flexion), gastroc belt stretch, sitting PF/DF, access code [email or web address]     . Patient verbalized/demonstrated understanding and was issued written handout. 2/8/21 theraband ankle PF, INV, EV, SLS, access code [email or web address]      Therapeutic Exercise and NMR EXR  [x] (04628) Provided verbal/tactile cueing for activities related to strengthening, flexibility, endurance, ROM for improvements in LE, proximal hip, and core control with self care, mobility, lifting, ambulation.  [] (68747) Provided verbal/tactile cueing for activities related to improving balance, coordination, kinesthetic sense, posture, motor skill, proprioception  to assist with LE, proximal hip, and core control in self care, mobility, lifting, ambulation and eccentric single leg control.      NMR and Therapeutic Activities:    [] (77781 or 08894) Provided verbal/tactile cueing for activities related to improving balance, coordination, kinesthetic sense, posture, motor skill, proprioception and motor activation to allow for proper function of core, proximal hip and LE with self care and ADLs and functional mobility.   [] (28484) Gait Re-education- Provided achieved in: 2 weeks  1. Independent in HEP and progression per patient tolerance, in order to prevent re-injury. []? Progressing: []? Met: []? Not Met: []? Adjusted  2. Patient will have a decrease in pain to facilitate improvement in movement, function, and ADLs as indicated by Functional Deficits. []? Progressing: []? Met: []? Not Met: []? Adjusted     Long Term Goals: To be achieved in: 6 weeks  1. Disability index score of 25% or less for the [facility] to assist with reaching prior level of function. []? Progressing: []? Met: []? Not Met: []? Adjusted  2. Patient will demonstrate increased AROM to 0 degrees DF to allow for proper joint functioning as indicated by patients Functional Deficits. []? Progressing: []? Met: []? Not Met: []? Adjusted  3. Patient will demonstrate an increase in Strength to good proximal hip strength and control, within 5lb HHD in LE to allow for proper functional mobility as indicated by patients Functional Deficits. []? Progressing: []? Met: []? Not Met: []? Adjusted  4. Patient will return to climbing stairs without increased symptoms or restriction. []? Progressing: []? Met: []? Not Met: []? Adjusted  5. Patient will be able to [facility] normal again\" for community distances. []? Progressing: []? Met: []? Not Met: []? Adjusted         ASSESSMENT:  See eval    Treatment/Activity Tolerance:  [x] Patient tolerated treatment well [] Patient limited by fatique  [] Patient limited by pain  [] Patient limited by other medical complications  [] Other:     Overall Progression Towards Functional goals/ Treatment Progress Update:  [] Patient is progressing as expected towards functional goals listed. [] Progression is slowed due to complexities/Impairments listed. [] Progression has been slowed due to co-morbidities.   [x] Plan just implemented, too soon to assess goals progression <30days   [] Goals require adjustment due to lack of progress  [] Patient is not progressing as expected and requires additional follow up with physician  [] Other    Prognosis for POC: [x] Good [] Fair  [] Poor    Patient requires continued skilled intervention: [x] Yes  [] No        PLAN: Add to HEP standing heel raises and squats  [] Continue per plan of care [] Alter current plan (see comments)  [x] Plan of care initiated [] Hold pending MD visit [] Discharge    Electronically signed by: Venice Morillo,     Note: If patient does not return for scheduled/recommended follow up visits, this note will serve as a discharge from care along with the most recent update on progress.

## 2021-02-12 ENCOUNTER — HOSPITAL ENCOUNTER (OUTPATIENT)
Dept: PHYSICAL THERAPY | Age: 36
Setting detail: THERAPIES SERIES
Discharge: HOME | End: 2021-02-12
Payer: COMMERCIAL

## 2021-02-12 PROCEDURE — 97140 MANUAL THERAPY 1/> REGIONS: CPT

## 2021-02-12 PROCEDURE — 97112 NEUROMUSCULAR REEDUCATION: CPT

## 2021-02-12 PROCEDURE — 97110 THERAPEUTIC EXERCISES: CPT

## 2021-02-12 NOTE — FLOWSHEET NOTE
[facility] [address] [address]  Phone: [phone]   Fax:     [phone]       Physical Therapy Discharge Summary    Dear Dr. Prasad Upton,    We had the pleasure of treating the following patient for physical therapy services at [address] [place] [address]. A summary of our findings can be found in the discharge summary below. If you have any questions or concerns regarding these findings, please do not hesitate to contact me at the office phone number above. Thank you for the referral.     Physician Signature:________________________________Date:__________________  By signing above (or electronic signature), therapists plan is approved by physician      Overall Response to Treatment:   [x]Patient is responding well to treatment and improvement is noted with regards  to goals   []Patient should continue to improve in reasonable time if they continue HEP   []Patient has plateaued and is no longer responding to skilled PT intervention    []Patient is getting worse and would benefit from return to referring MD   []Patient unable to adhere to initial POC   []Other: Pt met all goals, has good strength and improving ROM. Pt will continue HEP to help improve her ROM, in particular DF. Pt made excellent progress in 4 PT sessions. Date range of Visits: 21-21  Total Visits: 4  -                                                [facility] [address] [address]  Phone: [phone]   Fax:     [phone]      Physical Therapy Treatment Note/ Progress Report:     Date:  2021    Patient Name:  Luisa Peres    :  1985  MRN: [id number]    Pertinent Medical History:     Medical/Treatment Diagnosis Information:  · Diagnosis: s/p ORIF (open reduction internal fixation) fracture (Z98.890);  Closed fracture of left ankle with routine healing, subsequent encounter (U58820L)  · Treatment Diagnosis: Decreased ADL status    Insurance/Certification information:  PT Insurance Information: [facility]  Physician Information:  Referring Practitioner: Dr. Dorn Oppenheim of care signed (Y/N): Y    Date of Patient follow up with Physician:      Progress Report: []  Yes  [x]  No     Date Range for reporting period:  Beginnin21  Endin21    Progress report due (10 Rx/or 30 days whichever is less):      Recertification due (POC duration/ or 90 days whichever is less):      Visit # POC/Insurance Allowable Auth Needed   4 4 []Yes   []No     Latex Allergy:  [x]NO      []YES  Preferred Language for Healthcare:   [x]English       []other:  Functional Scale: WOMAC = 21 43/96= 44.8% dysfunction; 21 1 = 1% dysfunction     Pain level:  0/10     History of Injury: On 20 pt slipped on ice and fractured her L ankle. On 21 pt had Open treatment of the left bimalleolar equivalent ankle fracture dislocation with internal fixation. Pt stated she was NWB until approximately 3 weeks ago. Pt has pain with WB. Pt stated she is allowed to wean from her immobilization boot to her shoe. SUBJECTIVE:    2-10-21  Mild soreness after last visit. 21 pt noted her ankle is improving. Pt can now \"wear my regular shoe\"  Pt stated she has no pain and that she is ready to finish PT.   Pt is working 10 hours/week currently, per report, as an       OBJECTIVE:   Observation:    Test measurements:    ROM L60 LE 21   Ankle PF AROM 45  PROM 45 AROM 60     Ankle DF AROM -12  PROM -30 AROM +3  PROM +5    Ankle In AROM 8  PROM 9 AROM 28   PROM 31    Ankle Ev AROM 7  PROM 8 AROM 18  PROM 21    Strength  RIGHT    Ankle DF 3/5, pain 5/5   Ankle PF 3/5, pain 5/5   Ankle Inv 3/5, pain 5/5   Ankle EV 3/5, pain 5/5         RESTRICTIONS/PRECAUTIONS: Exercises/Interventions:     Therapeutic Ex (88407)   Min: 20 Reps/Resistance Notes/CUES   Nu Step L-1 x 5 min    L ankle PROM  PF/DF, INV/EV 10x each    L ankle AAROM  PF/DF, INV/EV 10x each    L ankle isometrics  INV  EV 10x each         Leg press B  60#, 2 x 15 B Sled #4        Manual Intervention (45790)  Min:12     STM  Gastrocs/soleus No reported tenderness                            NMR re-education (73769)  Min:12  CUES NEEDED   BAPS L, level II  PF/DF, INV/EV, CW, CCW 20x each L    SLS 5\", 10x    Standing heel raises  Standing toe raises 10x B  10x B    Squat 10x         Therapeutic Activity (02242)  Min:               Modalities  Min:                           Other Therapeutic Activities: Pt was educated on PT POC, Diagnosis, Prognosis, pathomechanics as well as frequency and duration of scheduling future physical therapy appointments. Time was also taken on this day to answer all patient questions and participation in PT. Reviewed appointment policy in detail with patient and patient verbalized understanding. Home Exercise Program: Patient was instructed in the following for HEP:AROM ankle PF/DF, INV/EV, ankle circles, towel scrunches (toe curls into flexion), gastroc belt stretch, sitting PF/DF, access code [email or web address]     . Patient verbalized/demonstrated understanding and was issued written handout. 2/8/21 theraband ankle PF, INV, EV, SLS, access code [email or web address]      Therapeutic Exercise and NMR EXR  [x] (38841) Provided verbal/tactile cueing for activities related to strengthening, flexibility, endurance, ROM for improvements in LE, proximal hip, and core control with self care, mobility, lifting, ambulation.  [] (52609) Provided verbal/tactile cueing for activities related to improving balance, coordination, kinesthetic sense, posture, motor skill, proprioception  to assist with LE, proximal hip, and core control in self care, mobility, lifting, ambulation and eccentric single leg control. NMR and Therapeutic Activities:    [] (07791 or 52716) Provided verbal/tactile cueing for activities related to improving balance, coordination, kinesthetic sense, posture, motor skill, proprioception and motor activation to allow for proper function of core, proximal hip and LE with self care and ADLs and functional mobility.   [] (82065) Gait Re-education- Provided training and instruction to the patient for proper LE, core and proximal hip recruitment and positioning and eccentric body weight control with ambulation re-education including up and down stairs     Home Exercise Program:    [x] (73402) Reviewed/Progressed HEP activities related to strengthening, flexibility, endurance, ROM of core, proximal hip and LE for functional self-care, mobility, lifting and ambulation/stair navigation   [] (30866)Reviewed/Progressed HEP activities related to improving balance, coordination, kinesthetic sense, posture, motor skill, proprioception of core, proximal hip and LE for self care, mobility, lifting, and ambulation/stair navigation      Manual Treatments:  PROM / STM / Oscillations-Mobs:  G-I, II, III, IV (PA's, Inf., Post.)  [] (94615) Provided manual therapy to mobilize LE, proximal hip and/or LS spine soft tissue/joints for the purpose of modulating pain, promoting relaxation,  increasing ROM, reducing/eliminating soft tissue swelling/inflammation/restriction, improving soft tissue extensibility and allowing for proper ROM for normal function with self care, mobility, lifting and ambulation.      If [facility] Please Indicate Time In/Out  CPT Code Time in Time out        15713 therapeutic exercise 1141 1200   52927 Neuromuscular reeducation 1200 1215   58169 Manual 1211 9204     Charges:  Timed Code Treatment Minutes: 45   Total Treatment Minutes: 45      [] EVAL (LOW) 54105 (typically 20 minutes face-to-face)  [] EVAL (MOD) 72255 (typically 30 minutes face-to-face)  [] EVAL (HIGH) 56125 (typically 45 minutes face-to-face)  [] RE-EVAL     [x] CP(95560) x  1   [] Dry needle 1 or 2 Muscles (43412)  [x] NMR (97227) x 1    [] Dry needle 3+ Muscles (75357)  [x] Manual (51955) x     [] Ultrasound (79658) x  [] TA (64755) x     [] Mech Traction (33940)  [] ES(attended) (61957)     [] ES (un) (88552):   [] Vasopump (88011) [] Ionto (54855)   [] Other:    GOALS:  Short Term Goals: To be achieved in: 2 weeks  1. Independent in HEP and progression per patient tolerance, in order to prevent re-injury. []? Progressing: [x]? Met: []? Not Met: []? Adjusted  2. Patient will have a decrease in pain to facilitate improvement in movement, function, and ADLs as indicated by Functional Deficits. []? Progressing: [x]? Met: []? Not Met: []? Adjusted     Long Term Goals: To be achieved in: 6 weeks  1. Disability index score of 25% or less for the [facility] to assist with reaching prior level of function. []? Progressing: [x]? Met: []? Not Met: []? Adjusted  2. Patient will demonstrate increased AROM to 0 degrees DF to allow for proper joint functioning as indicated by patients Functional Deficits. []? Progressing: [x]? Met: []? Not Met: []? Adjusted  3. Patient will demonstrate an increase in Strength to good proximal hip strength and control, within 5lb HHD in LE to allow for proper functional mobility as indicated by patients Functional Deficits. []? Progressing: [x]? Met: []? Not Met: []? Adjusted  4. Patient will return to climbing stairs without increased symptoms or restriction. []? Progressing: [x]? Met: []? Not Met: []? Adjusted  5. Patient will be able to [facility] normal again\" for community distances. []? Progressing: [x]? Met: []? Not Met: []?  Adjusted         ASSESSMENT:  See eval    Treatment/Activity Tolerance:  [x] Patient tolerated treatment well [] Patient limited by fatique  [] Patient limited by pain  [] Patient limited by other medical complications  [] Other:     Overall Progression Towards Functional goals/ Treatment Progress Update:  [] Patient is progressing as expected towards functional goals listed. [] Progression is slowed due to complexities/Impairments listed. [] Progression has been slowed due to co-morbidities. [x] Plan just implemented, too soon to assess goals progression <30days   [] Goals require adjustment due to lack of progress  [] Patient is not progressing as expected and requires additional follow up with physician  [] Other    Prognosis for POC: [x] Good [] Fair  [] Poor    Patient requires continued skilled intervention: [x] Yes  [] No        PLAN: Add to HEP standing heel raises and squats  [] Continue per plan of care [] Alter current plan (see comments)  [] Plan of care initiated [] Hold pending MD visit [x] Discharge    Electronically signed by: Delfino Dela Cruz PT , OMT-C, [id number]      Note: If patient does not return for scheduled/recommended follow up visits, this note will serve as a discharge from care along with the most recent update on progress.

## 2021-02-15 ENCOUNTER — APPOINTMENT (OUTPATIENT)
Dept: PHYSICAL THERAPY | Age: 36
End: 2021-02-15
Payer: COMMERCIAL

## 2021-02-17 ENCOUNTER — APPOINTMENT (OUTPATIENT)
Dept: PHYSICAL THERAPY | Age: 36
End: 2021-02-17
Payer: COMMERCIAL

## 2021-02-22 ENCOUNTER — APPOINTMENT (OUTPATIENT)
Dept: PHYSICAL THERAPY | Age: 36
End: 2021-02-22
Payer: COMMERCIAL

## 2021-02-24 ENCOUNTER — APPOINTMENT (OUTPATIENT)
Dept: PHYSICAL THERAPY | Age: 36
End: 2021-02-24
Payer: COMMERCIAL

## 2021-03-09 DIAGNOSIS — G90.522 COMPLEX REGIONAL PAIN SYNDROME TYPE 1 OF LEFT LOWER EXTREMITY: ICD-10-CM

## 2021-03-12 RX ORDER — GABAPENTIN 100 MG/1
CAPSULE ORAL
Qty: 90 CAPSULE | Refills: 0 | Status: SHIPPED | OUTPATIENT
Start: 2021-03-12 | End: 2021-04-11

## 2021-04-08 ENCOUNTER — OFFICE VISIT (OUTPATIENT)
Dept: ORTHOPEDIC SURGERY | Age: 36
End: 2021-04-08
Payer: COMMERCIAL

## 2021-04-08 VITALS — HEIGHT: 65 IN | WEIGHT: 179 LBS | RESPIRATION RATE: 16 BRPM | BODY MASS INDEX: 29.82 KG/M2

## 2021-04-08 DIAGNOSIS — G90.522 COMPLEX REGIONAL PAIN SYNDROME TYPE 1 OF LEFT LOWER EXTREMITY: ICD-10-CM

## 2021-04-08 DIAGNOSIS — S82.892D CLOSED FRACTURE OF LEFT ANKLE WITH ROUTINE HEALING, SUBSEQUENT ENCOUNTER: Primary | ICD-10-CM

## 2021-04-08 PROCEDURE — 99213 OFFICE O/P EST LOW 20 MIN: CPT | Performed by: ORTHOPAEDIC SURGERY

## 2021-04-08 NOTE — PROGRESS NOTES
ORTHOPAEDIC NEW PATIENT NOTE    Chief Complaint   Patient presents with    Post-Op Check     Open treatment of the left bimalleolar equivalent ankle fracture dislocation with internal fixation; 12/7/20. HPI   4/8/2021  FU left ankle  Tariq Gibbons is doing well  Pain is rated 1/10  Using gabapentin 100mg TID  Did formal PT, 4 sessions, with good response  Back to work without restrictions already ()      2/4/2021  Tariq Gibbons returns today for her left ankle  She is here with the [facility]  She is back at work as  - part time  She is having increasing and severe left ankle pain  Pain is located laterally, anteriorly, and medially (diffuse)  There has been no new injury  She is trying to come out of the boot  Physical therapy has been approved by [facility], first session tomorrow  No wound/incisional issues      1/19/2021  FU left ankle, second postop visit  She is doing well  Minimal pain  Improved swelling  No incisional issues  Using boot, maintaining NWB status  She is now filing under [facility]  Denies N/T      12/17/2020  First postop  She is doing well  Pain is well controlled  No incisional issues  Denies N/T      12/7/2020  OPERATION PERFORMED:  Open treatment of the left bimalleolar equivalent ankle fracture dislocation with internal fixation.       12/4/2020  37 y.o. female seen for evaluation of left ankle fracture/dislocation:    Onset yesterday  Injury/trauma - slipped on ice  History of symptoms denies  Pain is located left ankle diffuse  Worse with dependent position, motion, WB  Better with splint  Associated with obvious deformity, reduced in ED    Does not smoke  No history of VTE      No Known Allergies     Current Outpatient Medications   Medication Sig Dispense Refill    gabapentin (NEURONTIN) 100 MG capsule TAKE ONE CAPSULE BY MOUTH THREE TIMES A DAY 90 capsule 0    naproxen (NAPROSYN) 500 MG tablet Take 500 mg by mouth 2 times daily (with meals)      diclofenac (VOLTAREN) 50 MG EC tablet Take 1 tablet by mouth 2 times daily (with meals) 60 tablet 1    citalopram (CELEXA) 20 MG tablet Take 40 mg by mouth daily      ondansetron (ZOFRAN ODT) 4 MG disintegrating tablet Take 1 tablet by mouth every 8 hours as needed for Nausea or Vomiting (Patient not taking: Reported on 2/4/2021) 20 tablet 0     No current facility-administered medications for this visit.         Past Medical History:   Diagnosis Date    Depression     IUD (intrauterine device) in place     Prolonged emergence from general anesthesia     Slow to wake up        Past Surgical History:   Procedure Laterality Date    ANKLE FRACTURE SURGERY Left 12/7/2020    LEFT ANKLE OPEN REDUCTION INTERNAL FIXATION performed by Rabia Parham MD at [facility]      X 2       Family History   Problem Relation Age of Onset    High Blood Pressure Father          Social History     Socioeconomic History    Marital status:      Spouse name: Not on file    Number of children: Not on file    Years of education: Not on file    Highest education level: Not on file   Occupational History    Not on file   Social Needs    Financial resource strain: Not on file    Food insecurity     Worry: Not on file     Inability: Not on file    Transportation needs     Medical: Not on file     Non-medical: Not on file   Tobacco Use    Smoking status: Former Smoker     Packs/day: 0.50     Types: Cigarettes    Smokeless tobacco: Never Used   Substance and Sexual Activity    Alcohol use: Never     Frequency: Never    Drug use: Never    Sexual activity: Yes     Partners: Male   Lifestyle    Physical activity     Days per week: Not on file     Minutes per session: Not on file    Stress: Not on file   Relationships    Social connections     Talks on phone: Not on file     Gets together: Not on file     Attends Mandaeism service: Not on file     Active member of club or organization: Not on file Attends meetings of clubs or organizations: Not on file     Relationship status: Not on file    Intimate partner violence     Fear of current or ex partner: Not on file     Emotionally abused: Not on file     Physically abused: Not on file     Forced sexual activity: Not on file   Other Topics Concern    Not on file   Social History Narrative    Not on file        Vitals:    04/08/21 1611   Resp: 16   Weight: 179 lb (81.2 kg)   Height: 5' 5\" (1.651 m)       Physical Exam  Body mass index is 29.79 kg/m². NAD  SILT SP/DP/T/sural/saphenous nerve distributions; EHL/FHL/TA/GS intact   DP pulse intact, RRR  Left foot/ankle - lateral surgical incision well healed, c/d/i   Trace swelling    No erythema   No evidence of infection or cellulitis   No TTP today   AROM DF/PF/inversion/eversion maintained      Imaging:  Images were personally reviewed by myself   Left ankle XRs from prior reviewed - s/p ORIF of the lateral malleolus with lag screw and neutralization plate. No hardware complications. Fracture appears healed, including of the small posterior malleolus. No syndesmotic widening, mortise is intact. Soft tissue swelling seen. Assessment & Plan:  37 y.o. female who presents with    Diagnosis Orders   1. Closed fracture of left ankle with routine healing, subsequent encounter     2. Complex regional pain syndrome type 1 of left lower extremity      improving/resolving       No orders of the defined types were placed in this encounter.     1/19/2021  Progressing along well  Advance LLE WBAT now              initially WBAT in boot              Over next 2-3 weeks, transition out of boot all together  Ice, elevate as needed for pain/swelling  Swelling should gradually improve/resolve over the next few months     PT for ROM and strengthening     RTW letter for 2/1/2021     FU PRN      2/4/2021  Unfortunately [facility] is doing worse compared to her last visit with me two weeks ago  Worsening left ankle pain  I am

## 2021-06-16 ENCOUNTER — TELEPHONE (OUTPATIENT)
Dept: ORTHOPEDIC SURGERY | Age: 36
End: 2021-06-16

## 2021-06-16 NOTE — TELEPHONE ENCOUNTER
[facility]  ask[facility] if [facility] has reached maximum improvement for injury of 12/3/2020. Also please provide permanent partial impairment rating. Please let  POOL know when this is ready.

## 2021-06-17 NOTE — TELEPHONE ENCOUNTER
Per Dr. Shweta Evans yes on the [facility] [address] [place] [address], but he does not do Impairment ratings to make that determination.

## 2021-07-12 ENCOUNTER — TELEPHONE (OUTPATIENT)
Dept: ORTHOPEDIC SURGERY | Age: 36
End: 2021-07-12

## 2021-07-12 DIAGNOSIS — S82.892D CLOSED FRACTURE OF LEFT ANKLE WITH ROUTINE HEALING, SUBSEQUENT ENCOUNTER: Primary | ICD-10-CM

## 2021-07-12 DIAGNOSIS — Z87.81 S/P ORIF (OPEN REDUCTION INTERNAL FIXATION) FRACTURE: ICD-10-CM

## 2021-07-12 DIAGNOSIS — Z98.890 S/P ORIF (OPEN REDUCTION INTERNAL FIXATION) FRACTURE: ICD-10-CM

## 2021-07-12 NOTE — TELEPHONE ENCOUNTER
I spoke to Jim. She is requesting the pt's PPI Permanant Partial Impairment, or percentage of disabilty. I let her know Dr Brandon Fry would be in office tomorrow, and we would ask him to advise then. I also let Hanh know this may require pt have a FCE. She understood this may be necessary.

## 2021-07-12 NOTE — TELEPHONE ENCOUNTER
Other Leah Chan nurse  called checking on an partial impairment rating request that she sent over.  ph [phone]

## 2021-07-13 NOTE — TELEPHONE ENCOUNTER
Per Dr Kendar Gasca, pt would need an FCE. I spoke to Jim. She requests the FCE order be faxed to her. Fax: [phone]. Order has been faxed.

## (undated) DEVICE — GLOVE SURG SZ 8 L12IN FNGR THK79MIL GRN LTX FREE

## (undated) DEVICE — BANDAGE COBAN 6 IN WND 6INX5YD FOAM

## (undated) DEVICE — BIT DRL L110MM DIA2.5MM G QUIK CPL W/O STP REUSE

## (undated) DEVICE — GLOVE SURG SZ 75 CRM LTX FREE POLYISOPRENE POLYMER BEAD ANTI

## (undated) DEVICE — PADDING UNDERCAST W4INXL4YD 100% COT CRIMPED FINISH WBRL II

## (undated) DEVICE — APPLICATOR MEDICATED 26 CC SOLUTION HI LT ORNG CHLORAPREP

## (undated) DEVICE — MAJOR SET UP PK

## (undated) DEVICE — SHEET,DRAPE,53X77,STERILE: Brand: MEDLINE

## (undated) DEVICE — DRAPE,U/SHT,SPLIT,FILM,60X84,STERILE: Brand: MEDLINE

## (undated) DEVICE — T-DRAPE,EXTREMITY,STERILE: Brand: MEDLINE

## (undated) DEVICE — SUTURE ETHLN SZ 3-0 L18IN NONABSORBABLE BLK FS-1 L24MM 3/8 663H

## (undated) DEVICE — GLOVE,SURG,SENSICARE,ALOE,LF,PF,9: Brand: MEDLINE

## (undated) DEVICE — SOLUTION IV IRRIG POUR BRL 0.9% SODIUM CHL 2F7124

## (undated) DEVICE — DRESSING,GAUZE,XEROFORM,CURAD,1"X8",ST: Brand: CURAD

## (undated) DEVICE — MERCY HEALTH WEST TURNOVER: Brand: MEDLINE INDUSTRIES, INC.

## (undated) DEVICE — COVER LT HNDL BLU PLAS

## (undated) DEVICE — GLOVE SURG 9 PF CRM STRL SENSICARE PI MIC LF

## (undated) DEVICE — 3M™ COBAN™ NL STERILE NON-LATEX SELF-ADHERENT WRAP, 2084S, 4 IN X 5 YD (10 CM X 4,5 M), 18 ROLLS/CASE: Brand: 3M™ COBAN™

## (undated) DEVICE — SUTURE VCRL SZ 3-0 L18IN ABSRB UD L26MM SH 1/2 CIR J864D

## (undated) DEVICE — Device

## (undated) DEVICE — COTTON UNDERCAST PADDING,CRIMPED FINISH: Brand: WEBRIL

## (undated) DEVICE — ELECTRODE PT RET AD L9FT HI MOIST COND ADH HYDRGEL CORDED

## (undated) DEVICE — SPONGE GZ W4XL4IN COT 12 PLY TYP VII WVN C FLD DSGN

## (undated) DEVICE — TOWEL,OR,DSP,ST,BLUE,STD,4/PK,20PK/CS: Brand: MEDLINE

## (undated) DEVICE — SUTURE VCRL SZ 0 L18IN ABSRB UD L36MM CT-1 1/2 CIR J840D

## (undated) DEVICE — PAD,ABDOMINAL,8"X7.5",STERILE,LF,1/PK: Brand: MEDLINE

## (undated) DEVICE — CANISTER, RIGID, 1200CC: Brand: MEDLINE INDUSTRIES, INC.

## (undated) DEVICE — SYRINGE MED 10ML LUERLOCK TIP W/O SFTY DISP

## (undated) DEVICE — GOWN SIRUS NONREIN XL W/TWL: Brand: MEDLINE INDUSTRIES, INC.

## (undated) DEVICE — DRAPE C ARM UNIV W41XL74IN CLR PLAS XR VELC CLSR POLY STRP

## (undated) DEVICE — NEEDLE HYPO 22GA L1 1/2IN PIVOTING SHLD FOR LUERLOCK SYR

## (undated) DEVICE — BANDAGE COMPR W6INXL12FT SMOOTH FOR LIMB EXSANG ESMARCH